# Patient Record
Sex: MALE | Race: WHITE | Employment: FULL TIME | URBAN - METROPOLITAN AREA
[De-identification: names, ages, dates, MRNs, and addresses within clinical notes are randomized per-mention and may not be internally consistent; named-entity substitution may affect disease eponyms.]

---

## 2018-07-31 ENCOUNTER — OFFICE VISIT (OUTPATIENT)
Dept: VASCULAR SURGERY | Age: 33
End: 2018-07-31

## 2018-07-31 VITALS
SYSTOLIC BLOOD PRESSURE: 118 MMHG | HEIGHT: 72 IN | WEIGHT: 172 LBS | DIASTOLIC BLOOD PRESSURE: 68 MMHG | BODY MASS INDEX: 23.3 KG/M2

## 2018-07-31 DIAGNOSIS — M54.2 CERVICAL PAIN (NECK): Primary | ICD-10-CM

## 2018-07-31 PROCEDURE — 99203 OFFICE O/P NEW LOW 30 MIN: CPT | Performed by: SURGERY

## 2018-07-31 RX ORDER — GABAPENTIN 300 MG/1
600 CAPSULE ORAL 3 TIMES DAILY
COMMUNITY

## 2018-07-31 NOTE — PROGRESS NOTES
daily as needed for Anxiety for 30 days. 1/6/12 2/5/12  Aniyah Sanders MD   alprazolam Katie Bc) 1 MG tablet Take 1 tablet by mouth 3 times daily as needed for 30 days. 12/9/11 1/8/12  Aniyah Sanders MD        Hospital Meds:    Current Outpatient Prescriptions   Medication Sig Dispense Refill    gabapentin (NEURONTIN) 600 MG tablet Take 600 mg by mouth 4 times daily. Diania Mutters metFORMIN (GLUCOPHAGE) 500 MG tablet Take 500 mg by mouth daily      finasteride (PROPECIA) 1 MG tablet Take 5 mg by mouth daily       oxyCODONE (OXY-IR) 15 MG immediate release tablet Take 15 mg by mouth every 8 hours as needed for Pain .  ibuprofen (ADVIL;MOTRIN) 800 MG tablet Take 1 tablet by mouth every 8 hours as needed for Pain or Fever 20 tablet 0    alprazolam (XANAX) 1 MG tablet Take 1 tablet by mouth 3 times daily as needed for Anxiety for 30 days. 90 tablet 2    alprazolam (XANAX) 1 MG tablet Take 1 tablet by mouth 3 times daily as needed for 30 days. 90 tablet 0     No current facility-administered medications for this visit. Social History:       Social History     Social History    Marital status: Single     Spouse name: N/A    Number of children: N/A    Years of education: N/A     Social History Main Topics    Smoking status: Never Smoker    Smokeless tobacco: None      Comment: Cigar per year    Alcohol use No    Drug use: No    Sexual activity: Not Asked     Other Topics Concern    None     Social History Narrative    None       Family Histroy:      Family History   Problem Relation Age of Onset    Heart Attack Paternal Grandfather        Review of Systems:  A 14 point review of systems was completed. Pertinent positives identified in the HPI, all other review of symptoms negative.         Physical Exam   Vital Signs: /68   Ht 6' (1.829 m)   Wt 172 lb (78 kg)   BMI 23.33 kg/m²        Admission Weight: 172 lb (78 kg)     General appearance: alert, appears stated age, cooperative and no AM

## 2018-08-23 ENCOUNTER — HOSPITAL ENCOUNTER (OUTPATIENT)
Dept: MRI IMAGING | Age: 33
Discharge: HOME | End: 2018-08-23
Attending: FAMILY MEDICINE | Admitting: FAMILY MEDICINE

## 2018-08-23 DIAGNOSIS — M25.511 RIGHT SHOULDER PAIN, UNSPECIFIED CHRONICITY: ICD-10-CM

## 2018-08-23 DIAGNOSIS — M79.5 FOREIGN BODY (FB) IN SOFT TISSUE: ICD-10-CM

## 2018-08-23 DIAGNOSIS — M25.571 RIGHT ANKLE PAIN, UNSPECIFIED CHRONICITY: ICD-10-CM

## 2021-08-16 ENCOUNTER — APPOINTMENT (OUTPATIENT)
Dept: ULTRASOUND IMAGING | Age: 36
DRG: 718 | End: 2021-08-16
Payer: OTHER GOVERNMENT

## 2021-08-16 ENCOUNTER — HOSPITAL ENCOUNTER (INPATIENT)
Age: 36
LOS: 1 days | Discharge: HOME | DRG: 718 | End: 2021-08-17
Attending: EMERGENCY MEDICINE | Admitting: INTERNAL MEDICINE
Payer: OTHER GOVERNMENT

## 2021-08-16 ENCOUNTER — ANESTHESIA (OUTPATIENT)
Dept: OPERATING ROOM | Age: 36
DRG: 718 | End: 2021-08-16
Payer: OTHER GOVERNMENT

## 2021-08-16 ENCOUNTER — ANESTHESIA EVENT (OUTPATIENT)
Dept: OPERATING ROOM | Age: 36
DRG: 718 | End: 2021-08-16
Payer: OTHER GOVERNMENT

## 2021-08-16 VITALS
RESPIRATION RATE: 19 BRPM | OXYGEN SATURATION: 98 % | DIASTOLIC BLOOD PRESSURE: 53 MMHG | TEMPERATURE: 96.8 F | SYSTOLIC BLOOD PRESSURE: 94 MMHG

## 2021-08-16 DIAGNOSIS — S31.30XA: Primary | ICD-10-CM

## 2021-08-16 PROBLEM — N50.1: Status: ACTIVE | Noted: 2021-08-16

## 2021-08-16 LAB
ANION GAP SERPL CALCULATED.3IONS-SCNC: 3 MMOL/L (ref 3–16)
APTT: 42.2 SEC (ref 26.2–38.6)
BASOPHILS ABSOLUTE: 0 K/UL (ref 0–0.2)
BASOPHILS RELATIVE PERCENT: 0.3 %
BUN BLDV-MCNC: 17 MG/DL (ref 7–20)
CALCIUM SERPL-MCNC: 8.9 MG/DL (ref 8.3–10.6)
CHLORIDE BLD-SCNC: 101 MMOL/L (ref 99–110)
CO2: 34 MMOL/L (ref 21–32)
CREAT SERPL-MCNC: 1 MG/DL (ref 0.9–1.3)
EOSINOPHILS ABSOLUTE: 0 K/UL (ref 0–0.6)
EOSINOPHILS RELATIVE PERCENT: 0.4 %
GFR AFRICAN AMERICAN: >60
GFR NON-AFRICAN AMERICAN: >60
GLUCOSE BLD-MCNC: 86 MG/DL (ref 70–99)
HCT VFR BLD CALC: 40.8 % (ref 40.5–52.5)
HEMOGLOBIN: 14.3 G/DL (ref 13.5–17.5)
INR BLD: 1.04 (ref 0.88–1.12)
LYMPHOCYTES ABSOLUTE: 1.5 K/UL (ref 1–5.1)
LYMPHOCYTES RELATIVE PERCENT: 15.3 %
MCH RBC QN AUTO: 31 PG (ref 26–34)
MCHC RBC AUTO-ENTMCNC: 34.9 G/DL (ref 31–36)
MCV RBC AUTO: 88.9 FL (ref 80–100)
MONOCYTES ABSOLUTE: 0.6 K/UL (ref 0–1.3)
MONOCYTES RELATIVE PERCENT: 6.2 %
NEUTROPHILS ABSOLUTE: 7.8 K/UL (ref 1.7–7.7)
NEUTROPHILS RELATIVE PERCENT: 77.8 %
PDW BLD-RTO: 13.7 % (ref 12.4–15.4)
PLATELET # BLD: 214 K/UL (ref 135–450)
PMV BLD AUTO: 7.5 FL (ref 5–10.5)
POTASSIUM REFLEX MAGNESIUM: 4.3 MMOL/L (ref 3.5–5.1)
PROTHROMBIN TIME: 11.8 SEC (ref 9.9–12.7)
RBC # BLD: 4.6 M/UL (ref 4.2–5.9)
SODIUM BLD-SCNC: 138 MMOL/L (ref 136–145)
WBC # BLD: 10 K/UL (ref 4–11)

## 2021-08-16 PROCEDURE — 6360000002 HC RX W HCPCS: Performed by: INTERNAL MEDICINE

## 2021-08-16 PROCEDURE — 6370000000 HC RX 637 (ALT 250 FOR IP): Performed by: INTERNAL MEDICINE

## 2021-08-16 PROCEDURE — 2500000003 HC RX 250 WO HCPCS: Performed by: NURSE ANESTHETIST, CERTIFIED REGISTERED

## 2021-08-16 PROCEDURE — 96375 TX/PRO/DX INJ NEW DRUG ADDON: CPT

## 2021-08-16 PROCEDURE — 6360000002 HC RX W HCPCS: Performed by: ANESTHESIOLOGY

## 2021-08-16 PROCEDURE — 6360000002 HC RX W HCPCS: Performed by: EMERGENCY MEDICINE

## 2021-08-16 PROCEDURE — 7100000001 HC PACU RECOVERY - ADDTL 15 MIN: Performed by: UROLOGY

## 2021-08-16 PROCEDURE — 7100000000 HC PACU RECOVERY - FIRST 15 MIN: Performed by: UROLOGY

## 2021-08-16 PROCEDURE — 2580000003 HC RX 258: Performed by: NURSE ANESTHETIST, CERTIFIED REGISTERED

## 2021-08-16 PROCEDURE — 85610 PROTHROMBIN TIME: CPT

## 2021-08-16 PROCEDURE — 6360000002 HC RX W HCPCS: Performed by: NURSE ANESTHETIST, CERTIFIED REGISTERED

## 2021-08-16 PROCEDURE — C9290 INJ, BUPIVACAINE LIPOSOME: HCPCS | Performed by: UROLOGY

## 2021-08-16 PROCEDURE — 1200000000 HC SEMI PRIVATE

## 2021-08-16 PROCEDURE — 85025 COMPLETE CBC W/AUTO DIFF WBC: CPT

## 2021-08-16 PROCEDURE — 36415 COLL VENOUS BLD VENIPUNCTURE: CPT

## 2021-08-16 PROCEDURE — 3700000001 HC ADD 15 MINUTES (ANESTHESIA): Performed by: UROLOGY

## 2021-08-16 PROCEDURE — 96376 TX/PRO/DX INJ SAME DRUG ADON: CPT

## 2021-08-16 PROCEDURE — 93975 VASCULAR STUDY: CPT

## 2021-08-16 PROCEDURE — 80048 BASIC METABOLIC PNL TOTAL CA: CPT

## 2021-08-16 PROCEDURE — 2580000003 HC RX 258: Performed by: INTERNAL MEDICINE

## 2021-08-16 PROCEDURE — 3600000004 HC SURGERY LEVEL 4 BASE: Performed by: UROLOGY

## 2021-08-16 PROCEDURE — 99283 EMERGENCY DEPT VISIT LOW MDM: CPT

## 2021-08-16 PROCEDURE — 0V950ZZ DRAINAGE OF SCROTUM, OPEN APPROACH: ICD-10-PCS | Performed by: UROLOGY

## 2021-08-16 PROCEDURE — 6360000002 HC RX W HCPCS

## 2021-08-16 PROCEDURE — 2709999900 HC NON-CHARGEABLE SUPPLY: Performed by: UROLOGY

## 2021-08-16 PROCEDURE — 3700000000 HC ANESTHESIA ATTENDED CARE: Performed by: UROLOGY

## 2021-08-16 PROCEDURE — 2580000003 HC RX 258: Performed by: UROLOGY

## 2021-08-16 PROCEDURE — 6360000002 HC RX W HCPCS: Performed by: UROLOGY

## 2021-08-16 PROCEDURE — 85730 THROMBOPLASTIN TIME PARTIAL: CPT

## 2021-08-16 PROCEDURE — 96374 THER/PROPH/DIAG INJ IV PUSH: CPT

## 2021-08-16 PROCEDURE — 3600000014 HC SURGERY LEVEL 4 ADDTL 15MIN: Performed by: UROLOGY

## 2021-08-16 PROCEDURE — 6370000000 HC RX 637 (ALT 250 FOR IP): Performed by: UROLOGY

## 2021-08-16 PROCEDURE — 3E0T3BZ INTRODUCTION OF ANESTHETIC AGENT INTO PERIPHERAL NERVES AND PLEXI, PERCUTANEOUS APPROACH: ICD-10-PCS | Performed by: UROLOGY

## 2021-08-16 PROCEDURE — 76870 US EXAM SCROTUM: CPT

## 2021-08-16 RX ORDER — ONDANSETRON 2 MG/ML
4 INJECTION INTRAMUSCULAR; INTRAVENOUS ONCE
Status: COMPLETED | OUTPATIENT
Start: 2021-08-16 | End: 2021-08-16

## 2021-08-16 RX ORDER — MAGNESIUM HYDROXIDE 1200 MG/15ML
LIQUID ORAL CONTINUOUS PRN
Status: COMPLETED | OUTPATIENT
Start: 2021-08-16 | End: 2021-08-16

## 2021-08-16 RX ORDER — LIDOCAINE HCL/PF 100 MG/5ML
SYRINGE (ML) INJECTION PRN
Status: DISCONTINUED | OUTPATIENT
Start: 2021-08-16 | End: 2021-08-16 | Stop reason: SDUPTHER

## 2021-08-16 RX ORDER — HYDROMORPHONE HCL 110MG/55ML
PATIENT CONTROLLED ANALGESIA SYRINGE INTRAVENOUS PRN
Status: DISCONTINUED | OUTPATIENT
Start: 2021-08-16 | End: 2021-08-16 | Stop reason: SDUPTHER

## 2021-08-16 RX ORDER — NALOXONE HYDROCHLORIDE 0.4 MG/ML
0.4 INJECTION, SOLUTION INTRAMUSCULAR; INTRAVENOUS; SUBCUTANEOUS PRN
Status: DISCONTINUED | OUTPATIENT
Start: 2021-08-16 | End: 2021-08-17 | Stop reason: HOSPADM

## 2021-08-16 RX ORDER — SODIUM CHLORIDE, SODIUM LACTATE, POTASSIUM CHLORIDE, CALCIUM CHLORIDE 600; 310; 30; 20 MG/100ML; MG/100ML; MG/100ML; MG/100ML
INJECTION, SOLUTION INTRAVENOUS CONTINUOUS
Status: DISCONTINUED | OUTPATIENT
Start: 2021-08-16 | End: 2021-08-16

## 2021-08-16 RX ORDER — SODIUM CHLORIDE 0.9 % (FLUSH) 0.9 %
5-40 SYRINGE (ML) INJECTION PRN
Status: DISCONTINUED | OUTPATIENT
Start: 2021-08-16 | End: 2021-08-17 | Stop reason: HOSPADM

## 2021-08-16 RX ORDER — LORAZEPAM 2 MG/ML
1 INJECTION INTRAMUSCULAR ONCE
Status: COMPLETED | OUTPATIENT
Start: 2021-08-16 | End: 2021-08-16

## 2021-08-16 RX ORDER — ONDANSETRON 2 MG/ML
4 INJECTION INTRAMUSCULAR; INTRAVENOUS
Status: DISCONTINUED | OUTPATIENT
Start: 2021-08-16 | End: 2021-08-16 | Stop reason: HOSPADM

## 2021-08-16 RX ORDER — FENTANYL CITRATE 50 UG/ML
INJECTION, SOLUTION INTRAMUSCULAR; INTRAVENOUS PRN
Status: DISCONTINUED | OUTPATIENT
Start: 2021-08-16 | End: 2021-08-16 | Stop reason: SDUPTHER

## 2021-08-16 RX ORDER — GABAPENTIN 300 MG/1
600 CAPSULE ORAL 3 TIMES DAILY
Status: DISCONTINUED | OUTPATIENT
Start: 2021-08-16 | End: 2021-08-17 | Stop reason: HOSPADM

## 2021-08-16 RX ORDER — ONDANSETRON 4 MG/1
4 TABLET, ORALLY DISINTEGRATING ORAL EVERY 8 HOURS PRN
Status: DISCONTINUED | OUTPATIENT
Start: 2021-08-16 | End: 2021-08-17 | Stop reason: HOSPADM

## 2021-08-16 RX ORDER — ACETAMINOPHEN 325 MG/1
650 TABLET ORAL EVERY 6 HOURS PRN
Status: DISCONTINUED | OUTPATIENT
Start: 2021-08-16 | End: 2021-08-17 | Stop reason: HOSPADM

## 2021-08-16 RX ORDER — OXYCODONE HYDROCHLORIDE AND ACETAMINOPHEN 5; 325 MG/1; MG/1
1 TABLET ORAL EVERY 4 HOURS PRN
COMMUNITY

## 2021-08-16 RX ORDER — FENTANYL CITRATE 50 UG/ML
25 INJECTION, SOLUTION INTRAMUSCULAR; INTRAVENOUS
Status: COMPLETED | OUTPATIENT
Start: 2021-08-16 | End: 2021-08-16

## 2021-08-16 RX ORDER — METHOCARBAMOL 500 MG/1
500 TABLET, FILM COATED ORAL 2 TIMES DAILY
COMMUNITY

## 2021-08-16 RX ORDER — POLYETHYLENE GLYCOL 3350 17 G/17G
17 POWDER, FOR SOLUTION ORAL DAILY PRN
Status: DISCONTINUED | OUTPATIENT
Start: 2021-08-16 | End: 2021-08-17 | Stop reason: HOSPADM

## 2021-08-16 RX ORDER — ONDANSETRON 2 MG/ML
4 INJECTION INTRAMUSCULAR; INTRAVENOUS EVERY 6 HOURS PRN
Status: DISCONTINUED | OUTPATIENT
Start: 2021-08-16 | End: 2021-08-17 | Stop reason: HOSPADM

## 2021-08-16 RX ORDER — FENTANYL CITRATE 50 UG/ML
25 INJECTION, SOLUTION INTRAMUSCULAR; INTRAVENOUS EVERY 5 MIN PRN
Status: DISCONTINUED | OUTPATIENT
Start: 2021-08-16 | End: 2021-08-16 | Stop reason: HOSPADM

## 2021-08-16 RX ORDER — SODIUM CHLORIDE 0.9 % (FLUSH) 0.9 %
5-40 SYRINGE (ML) INJECTION EVERY 12 HOURS SCHEDULED
Status: DISCONTINUED | OUTPATIENT
Start: 2021-08-16 | End: 2021-08-17 | Stop reason: HOSPADM

## 2021-08-16 RX ORDER — TADALAFIL 20 MG/1
20 TABLET ORAL PRN
COMMUNITY

## 2021-08-16 RX ORDER — PROPOFOL 10 MG/ML
INJECTION, EMULSION INTRAVENOUS PRN
Status: DISCONTINUED | OUTPATIENT
Start: 2021-08-16 | End: 2021-08-16 | Stop reason: SDUPTHER

## 2021-08-16 RX ORDER — CEFAZOLIN SODIUM 1 G/3ML
INJECTION, POWDER, FOR SOLUTION INTRAMUSCULAR; INTRAVENOUS PRN
Status: DISCONTINUED | OUTPATIENT
Start: 2021-08-16 | End: 2021-08-16 | Stop reason: SDUPTHER

## 2021-08-16 RX ORDER — ACETAMINOPHEN 650 MG/1
650 SUPPOSITORY RECTAL EVERY 6 HOURS PRN
Status: DISCONTINUED | OUTPATIENT
Start: 2021-08-16 | End: 2021-08-17 | Stop reason: HOSPADM

## 2021-08-16 RX ORDER — SODIUM CHLORIDE 9 MG/ML
INJECTION, SOLUTION INTRAVENOUS CONTINUOUS PRN
Status: DISCONTINUED | OUTPATIENT
Start: 2021-08-16 | End: 2021-08-16 | Stop reason: SDUPTHER

## 2021-08-16 RX ORDER — OXYCODONE HYDROCHLORIDE AND ACETAMINOPHEN 5; 325 MG/1; MG/1
1 TABLET ORAL EVERY 4 HOURS PRN
Status: DISCONTINUED | OUTPATIENT
Start: 2021-08-16 | End: 2021-08-17 | Stop reason: HOSPADM

## 2021-08-16 RX ORDER — OXYCODONE AND ACETAMINOPHEN 10; 325 MG/1; MG/1
1 TABLET ORAL EVERY 4 HOURS PRN
Status: DISCONTINUED | OUTPATIENT
Start: 2021-08-16 | End: 2021-08-16

## 2021-08-16 RX ORDER — FINASTERIDE 5 MG/1
5 TABLET, FILM COATED ORAL DAILY
Status: DISCONTINUED | OUTPATIENT
Start: 2021-08-16 | End: 2021-08-17 | Stop reason: HOSPADM

## 2021-08-16 RX ORDER — OXYCODONE HYDROCHLORIDE AND ACETAMINOPHEN 5; 325 MG/1; MG/1
1 TABLET ORAL EVERY 4 HOURS PRN
Status: DISCONTINUED | OUTPATIENT
Start: 2021-08-16 | End: 2021-08-16

## 2021-08-16 RX ORDER — FENTANYL CITRATE 50 UG/ML
25 INJECTION, SOLUTION INTRAMUSCULAR; INTRAVENOUS ONCE
Status: COMPLETED | OUTPATIENT
Start: 2021-08-16 | End: 2021-08-16

## 2021-08-16 RX ORDER — TRAZODONE HYDROCHLORIDE 150 MG/1
150 TABLET ORAL NIGHTLY
COMMUNITY
End: 2021-08-16 | Stop reason: CLARIF

## 2021-08-16 RX ORDER — DUTASTERIDE 0.5 MG/1
0.5 CAPSULE, LIQUID FILLED ORAL DAILY
COMMUNITY

## 2021-08-16 RX ORDER — TRAMADOL HYDROCHLORIDE 50 MG/1
50 TABLET ORAL EVERY 4 HOURS PRN
COMMUNITY

## 2021-08-16 RX ORDER — DEXAMETHASONE SODIUM PHOSPHATE 4 MG/ML
INJECTION, SOLUTION INTRA-ARTICULAR; INTRALESIONAL; INTRAMUSCULAR; INTRAVENOUS; SOFT TISSUE PRN
Status: DISCONTINUED | OUTPATIENT
Start: 2021-08-16 | End: 2021-08-16 | Stop reason: SDUPTHER

## 2021-08-16 RX ORDER — SODIUM CHLORIDE 9 MG/ML
25 INJECTION, SOLUTION INTRAVENOUS PRN
Status: DISCONTINUED | OUTPATIENT
Start: 2021-08-16 | End: 2021-08-17 | Stop reason: HOSPADM

## 2021-08-16 RX ORDER — OXYCODONE AND ACETAMINOPHEN 10; 325 MG/1; MG/1
1 TABLET ORAL EVERY 4 HOURS PRN
Status: DISCONTINUED | OUTPATIENT
Start: 2021-08-16 | End: 2021-08-17 | Stop reason: HOSPADM

## 2021-08-16 RX ORDER — METHOCARBAMOL 500 MG/1
500 TABLET, FILM COATED ORAL 2 TIMES DAILY
Status: DISCONTINUED | OUTPATIENT
Start: 2021-08-16 | End: 2021-08-17 | Stop reason: HOSPADM

## 2021-08-16 RX ORDER — ONDANSETRON 2 MG/ML
INJECTION INTRAMUSCULAR; INTRAVENOUS PRN
Status: DISCONTINUED | OUTPATIENT
Start: 2021-08-16 | End: 2021-08-16 | Stop reason: SDUPTHER

## 2021-08-16 RX ADMIN — FENTANYL CITRATE 100 MCG: 50 INJECTION, SOLUTION INTRAMUSCULAR; INTRAVENOUS at 20:54

## 2021-08-16 RX ADMIN — LORAZEPAM 1 MG: 2 INJECTION INTRAMUSCULAR; INTRAVENOUS at 13:17

## 2021-08-16 RX ADMIN — HYDROMORPHONE HYDROCHLORIDE 0.5 MG: 1 INJECTION, SOLUTION INTRAMUSCULAR; INTRAVENOUS; SUBCUTANEOUS at 22:15

## 2021-08-16 RX ADMIN — GABAPENTIN 600 MG: 300 CAPSULE ORAL at 23:25

## 2021-08-16 RX ADMIN — HYDROMORPHONE HYDROCHLORIDE 0.5 MG: 1 INJECTION, SOLUTION INTRAMUSCULAR; INTRAVENOUS; SUBCUTANEOUS at 15:05

## 2021-08-16 RX ADMIN — FENTANYL CITRATE 25 MCG: 50 INJECTION INTRAMUSCULAR; INTRAVENOUS at 18:53

## 2021-08-16 RX ADMIN — CEFAZOLIN SODIUM 2000 MG: 1 POWDER, FOR SOLUTION INTRAMUSCULAR; INTRAVENOUS at 20:25

## 2021-08-16 RX ADMIN — HYDROMORPHONE HYDROCHLORIDE 1 MG: 1 INJECTION, SOLUTION INTRAMUSCULAR; INTRAVENOUS; SUBCUTANEOUS at 09:06

## 2021-08-16 RX ADMIN — METHOCARBAMOL 500 MG: 500 TABLET ORAL at 23:25

## 2021-08-16 RX ADMIN — Medication 1 MG: at 12:54

## 2021-08-16 RX ADMIN — HYDROMORPHONE HYDROCHLORIDE 1 MG: 1 INJECTION, SOLUTION INTRAMUSCULAR; INTRAVENOUS; SUBCUTANEOUS at 11:37

## 2021-08-16 RX ADMIN — DEXAMETHASONE SODIUM PHOSPHATE 4 MG: 4 INJECTION, SOLUTION INTRAMUSCULAR; INTRAVENOUS at 20:22

## 2021-08-16 RX ADMIN — HYDROMORPHONE HYDROCHLORIDE 1 MG: 1 INJECTION, SOLUTION INTRAMUSCULAR; INTRAVENOUS; SUBCUTANEOUS at 13:08

## 2021-08-16 RX ADMIN — HYDROMORPHONE HYDROCHLORIDE 1 MG: 1 INJECTION, SOLUTION INTRAMUSCULAR; INTRAVENOUS; SUBCUTANEOUS at 11:04

## 2021-08-16 RX ADMIN — PROPOFOL 400 MG: 10 INJECTION, EMULSION INTRAVENOUS at 20:20

## 2021-08-16 RX ADMIN — ONDANSETRON 4 MG: 2 INJECTION INTRAMUSCULAR; INTRAVENOUS at 20:22

## 2021-08-16 RX ADMIN — ONDANSETRON 4 MG: 2 INJECTION INTRAMUSCULAR; INTRAVENOUS at 09:44

## 2021-08-16 RX ADMIN — HYDROMORPHONE HYDROCHLORIDE 1 MG: 1 INJECTION, SOLUTION INTRAMUSCULAR; INTRAVENOUS; SUBCUTANEOUS at 12:54

## 2021-08-16 RX ADMIN — FENTANYL CITRATE 25 MCG: 50 INJECTION INTRAMUSCULAR; INTRAVENOUS at 18:13

## 2021-08-16 RX ADMIN — HYDROMORPHONE HYDROCHLORIDE 1 MG: 1 INJECTION, SOLUTION INTRAMUSCULAR; INTRAVENOUS; SUBCUTANEOUS at 09:43

## 2021-08-16 RX ADMIN — SODIUM CHLORIDE, POTASSIUM CHLORIDE, SODIUM LACTATE AND CALCIUM CHLORIDE: 600; 310; 30; 20 INJECTION, SOLUTION INTRAVENOUS at 15:34

## 2021-08-16 RX ADMIN — HYDROMORPHONE HYDROCHLORIDE 0.5 MG: 1 INJECTION, SOLUTION INTRAMUSCULAR; INTRAVENOUS; SUBCUTANEOUS at 16:50

## 2021-08-16 RX ADMIN — Medication 100 MG: at 20:20

## 2021-08-16 RX ADMIN — HYDROMORPHONE HYDROCHLORIDE 1 MG: 2 INJECTION, SOLUTION INTRAMUSCULAR; INTRAVENOUS; SUBCUTANEOUS at 20:42

## 2021-08-16 RX ADMIN — GABAPENTIN 600 MG: 300 CAPSULE ORAL at 15:05

## 2021-08-16 RX ADMIN — DEXMEDETOMIDINE HYDROCHLORIDE 50 MCG: 100 INJECTION, SOLUTION INTRAVENOUS at 20:13

## 2021-08-16 RX ADMIN — HYDROMORPHONE HYDROCHLORIDE 1 MG: 1 INJECTION, SOLUTION INTRAMUSCULAR; INTRAVENOUS; SUBCUTANEOUS at 12:41

## 2021-08-16 RX ADMIN — HYDROMORPHONE HYDROCHLORIDE 1 MG: 1 INJECTION, SOLUTION INTRAMUSCULAR; INTRAVENOUS; SUBCUTANEOUS at 19:11

## 2021-08-16 RX ADMIN — HYDROMORPHONE HYDROCHLORIDE 1 MG: 2 INJECTION, SOLUTION INTRAMUSCULAR; INTRAVENOUS; SUBCUTANEOUS at 20:17

## 2021-08-16 RX ADMIN — METHOCARBAMOL 500 MG: 500 TABLET ORAL at 15:05

## 2021-08-16 RX ADMIN — SODIUM CHLORIDE: 9 INJECTION, SOLUTION INTRAVENOUS at 20:16

## 2021-08-16 RX ADMIN — LORAZEPAM 1 MG: 2 INJECTION INTRAMUSCULAR; INTRAVENOUS at 17:40

## 2021-08-16 RX ADMIN — FINASTERIDE 5 MG: 5 TABLET, FILM COATED ORAL at 15:05

## 2021-08-16 ASSESSMENT — PULMONARY FUNCTION TESTS
PIF_VALUE: 16
PIF_VALUE: 8
PIF_VALUE: 16
PIF_VALUE: 5
PIF_VALUE: 16
PIF_VALUE: 10
PIF_VALUE: 16
PIF_VALUE: 5
PIF_VALUE: 16
PIF_VALUE: 5
PIF_VALUE: 16
PIF_VALUE: 0
PIF_VALUE: 5
PIF_VALUE: 16
PIF_VALUE: 16
PIF_VALUE: 6
PIF_VALUE: 16
PIF_VALUE: 16
PIF_VALUE: 6
PIF_VALUE: 16
PIF_VALUE: 0
PIF_VALUE: 16
PIF_VALUE: 5
PIF_VALUE: 5
PIF_VALUE: 16
PIF_VALUE: 1
PIF_VALUE: 16
PIF_VALUE: 16
PIF_VALUE: 5
PIF_VALUE: 16
PIF_VALUE: 16
PIF_VALUE: 4
PIF_VALUE: 16
PIF_VALUE: 0
PIF_VALUE: 20
PIF_VALUE: 16
PIF_VALUE: 6
PIF_VALUE: 16
PIF_VALUE: 16
PIF_VALUE: 5
PIF_VALUE: 16
PIF_VALUE: 16
PIF_VALUE: 6
PIF_VALUE: 0
PIF_VALUE: 16
PIF_VALUE: 6
PIF_VALUE: 16
PIF_VALUE: 5
PIF_VALUE: 16
PIF_VALUE: 6
PIF_VALUE: 16
PIF_VALUE: 6
PIF_VALUE: 16

## 2021-08-16 ASSESSMENT — PAIN DESCRIPTION - DESCRIPTORS
DESCRIPTORS: THROBBING
DESCRIPTORS: THROBBING
DESCRIPTORS: ACHING
DESCRIPTORS: SHARP;SHOOTING;THROBBING;CONSTANT

## 2021-08-16 ASSESSMENT — PAIN SCALES - GENERAL
PAINLEVEL_OUTOF10: 10
PAINLEVEL_OUTOF10: 9
PAINLEVEL_OUTOF10: 9
PAINLEVEL_OUTOF10: 10
PAINLEVEL_OUTOF10: 0
PAINLEVEL_OUTOF10: 6
PAINLEVEL_OUTOF10: 10
PAINLEVEL_OUTOF10: 9
PAINLEVEL_OUTOF10: 9
PAINLEVEL_OUTOF10: 0
PAINLEVEL_OUTOF10: 8
PAINLEVEL_OUTOF10: 10
PAINLEVEL_OUTOF10: 5
PAINLEVEL_OUTOF10: 0
PAINLEVEL_OUTOF10: 7
PAINLEVEL_OUTOF10: 8
PAINLEVEL_OUTOF10: 8

## 2021-08-16 ASSESSMENT — PAIN DESCRIPTION - PAIN TYPE
TYPE: SURGICAL PAIN
TYPE: ACUTE PAIN
TYPE: SURGICAL PAIN
TYPE: ACUTE PAIN
TYPE: ACUTE PAIN

## 2021-08-16 ASSESSMENT — PAIN DESCRIPTION - LOCATION
LOCATION: SCROTUM
LOCATION: SCROTUM;PENIS
LOCATION: PERINEUM;ABDOMEN
LOCATION: SCROTUM
LOCATION: SCROTUM

## 2021-08-16 ASSESSMENT — PAIN DESCRIPTION - ORIENTATION
ORIENTATION: LEFT

## 2021-08-16 ASSESSMENT — PAIN DESCRIPTION - PROGRESSION: CLINICAL_PROGRESSION: GRADUALLY WORSENING

## 2021-08-16 ASSESSMENT — PAIN - FUNCTIONAL ASSESSMENT: PAIN_FUNCTIONAL_ASSESSMENT: PREVENTS OR INTERFERES WITH MANY ACTIVE NOT PASSIVE ACTIVITIES

## 2021-08-16 ASSESSMENT — PAIN DESCRIPTION - FREQUENCY
FREQUENCY: CONTINUOUS

## 2021-08-16 ASSESSMENT — PAIN DESCRIPTION - ONSET
ONSET: ON-GOING
ONSET: ON-GOING

## 2021-08-16 NOTE — ED TRIAGE NOTES
Patient reports to ED with left testicular pain 9/10. Was hit with softball yesterday afternoon, tried to \"sleep it off\" but woke up this morning with extreme pain and swelling of left testicle. A&Ox4.

## 2021-08-16 NOTE — H&P
Hospital Medicine History & Physical      PCP: Referring Not In System (Inactive)    Date of Admission: 8/16/2021      Chief Complaint:      Chief Complaint   Patient presents with    Testicle Pain           History Of Present Illness:    43 y.o. male who presented to hospital with cc of left sided testicular pain. Patient was playing soft ball , and had soft ball injury. He has increasing pain and swelling associated bruise which prompted ER visit. In ER US showed ?hematoma with questionable rupture of tunica. Urology was consulted, they are planning scrotal exploration however patient had lunch in ER which postponed surgery. Patient currently c/o severe pain in left testicle. Past Medical History:          Diagnosis Date    Back pain     Complex regional pain syndrome     Insomnia     Kidney disease     Neck pain     Post traumatic stress disorder        Past Surgical History:          Procedure Laterality Date    ARM SURGERY      upper arm/elbow surgery    HERNIA REPAIR         Medications Prior to Admission:      Prior to Admission medications    Medication Sig Start Date End Date Taking? Authorizing Provider   oxyCODONE-acetaminophen (PERCOCET) 5-325 MG per tablet Take 1 tablet by mouth every 4 hours as needed for Pain. Yes Historical Provider, MD   dutasteride (AVODART) 0.5 MG capsule Take 0.5 mg by mouth daily    Yes Historical Provider, MD   traMADol (ULTRAM) 50 MG tablet Take 50 mg by mouth every 4 hours as needed for Pain. Yes Historical Provider, MD   tadalafil (CIALIS) 20 MG tablet Take 20 mg by mouth as needed for Erectile Dysfunction    Yes Historical Provider, MD   methocarbamol (ROBAXIN) 500 MG tablet Take 500 mg by mouth 2 times daily    Yes Historical Provider, MD   gabapentin (NEURONTIN) 300 MG capsule Take 600 mg by mouth 3 times daily.     Yes Historical Provider, MD   metFORMIN (GLUCOPHAGE) 500 MG tablet Take 500 mg by mouth 2 times daily (with meals)    Yes Historical Provider, MD       Allergies:  Patient has no known allergies. Social History:      The patient currentlylives at home    TOBACCO:   reports that he has never smoked. He has never used smokeless tobacco.  ETOH:   reports no history of alcohol use. Family History:      . Positive as follows:        Problem Relation Age of Onset    Heart Attack Paternal Grandfather        REVIEW OF SYSTEMS:   Pertinent positives as noted in the HPI. All other systems reviewed and negative. PHYSICAL EXAM PERFORMED:    /88   Pulse 93   Temp 98.6 °F (37 °C) (Oral)   Resp 26   Ht 6' (1.829 m)   Wt 190 lb (86.2 kg)   SpO2 100%   BMI 25.77 kg/m²     Physical Exam  Constitutional:       Appearance: Normal appearance. HENT:      Head: Normocephalic and atraumatic. Cardiovascular:      Rate and Rhythm: Normal rate and regular rhythm. Pulses: Normal pulses. Heart sounds: Normal heart sounds. Pulmonary:      Effort: Pulmonary effort is normal.      Breath sounds: Normal breath sounds. Abdominal:      General: Abdomen is flat. Palpations: Abdomen is soft. Genitourinary:     Comments: Left testes swollen, bruise. Musculoskeletal:         General: Normal range of motion. Skin:     General: Skin is warm and dry. Capillary Refill: Capillary refill takes less than 2 seconds. Neurological:      General: No focal deficit present. Mental Status: He is alert and oriented to person, place, and time. Psychiatric:         Mood and Affect: Mood normal.         Behavior: Behavior normal.         Labs:     Recent Labs     08/16/21  1140   WBC 10.0   HGB 14.3   HCT 40.8        Recent Labs     08/16/21  1140      K 4.3      CO2 34*   BUN 17   CREATININE 1.0   CALCIUM 8.9     No results for input(s): AST, ALT, BILIDIR, BILITOT, ALKPHOS in the last 72 hours.   Recent Labs     08/16/21  1140   INR 1.04     No results for input(s): Mauricio Oiler in the last 72 hours.    Urinalysis:   Lab Results   Component Value Date    NITRU Negative 04/02/2017    WBCUA 1 04/02/2017    RBCUA 1 04/02/2017    BLOODU Negative 04/02/2017    SPECGRAV 1.030 04/02/2017    GLUCOSEU Negative 04/02/2017       Radiology:         US SCROTUM AND TESTICLES   Final Result      1. Focal area of heterogeneity in the left testicle likely representing testicular hematoma given the history of trauma. 2.  Focal contour bulge at the location of heterogeneity can be an indirect sign of tunica rupture. 3.  Heterogeneous enlarged epididymis suggesting additional epididymal injury. 4.  Hemorrhagic left scrotal fluid and scrotal hematoma. 5.  No evidence of torsion. US DUP ABD PEL RETRO SCROT COMPLETE   Final Result      1. Focal area of heterogeneity in the left testicle likely representing testicular hematoma given the history of trauma. 2.  Focal contour bulge at the location of heterogeneity can be an indirect sign of tunica rupture. 3.  Heterogeneous enlarged epididymis suggesting additional epididymal injury. 4.  Hemorrhagic left scrotal fluid and scrotal hematoma. 5.  No evidence of torsion. ASSESSMENT & PLAN  Active Problems:    Hematoma of testis  Resolved Problems:    * No resolved hospital problems. *    Plan  Hematoma  Admit to Tele  NPO  Dilaudid pain panel, with narcan PRN  Urology consulted, discussed with them ,surgery tonight. Pre diabetes  Hold home metformin. DVT ppx: hold lovenox for procedure as well as testicular hematoma. Code Status: Full        Cayetano Ruelas MD    Thank you Referring Not In System (Inactive) for the opportunity to be involved in this patient's care. If youhave any questions or concerns please feel free to contact me at [phone].

## 2021-08-16 NOTE — CONSULTS
Clinical Pharmacy Progress Note  Pharmacy to assist with Home Medication information    Admit date: 8/16/2021     Subjective/Objective:  Patient is a 37yr old male admitted with testicular hematoma. Pharmacy has been asked by Dr. Yasir Costa to assist with obtaining home medication information. Assessment/Plan:    1. Home Medication information:    · Spoke with patient and family member in the room. Also attempted to confirm with [facility] report and some [facility]. · Patient was certainly in discomfort yet still able to answer most questions. · Home Medication List in Epic is complete. Changes made to medication list:   Medications removed (no longer taking):  · Finasteride  · Prazosin  · Alprazolam  · Oxycodone  · Ibuprofen  · Trazodone    Medications added:   · Tramadol    Medication doses / instructions adjusted:   · Gabapentin dose  · Metformin dose    Dr. Yasir Costa aware of updated list.       Thank you, please call with questions.    Lugenia Boxer PharmD., [facility]   8/16/2021 1:49 PM  Wireless: [phone]

## 2021-08-16 NOTE — PROGRESS NOTES
Patient alert and oriented x4. VSS with the exception of slightly elevated bp d/t pain. Patient complaining of 10/10 pain in scrotal area, prn pain medication given with not much benefit. Patient with swollen scrotal area, ice applied. Patient with no urination since arrival to unit, will bladder scan as needed. IVF infusing. Patient denies any other needs at this time. Bed is in the lowest position, call light and bedside table within reach. Patient bed alarm is on. Will continue to monitor for changes in patient status.      Electronically signed by Brittany Ramos RN on 8/16/2021 at 4:57 PM

## 2021-08-16 NOTE — ED PROVIDER NOTES
[id number]          ATTENDING PHYSICIAN NOTE       Date of evaluation: 8/16/2021    Chief Complaint     Testicle Pain      History of Present Illness     Anoop Weston is a 43 y.o. male who presents after being hit in the left testicle yesterday afternoon about 3:00 with a softball. Says that he was down for couple minutes, but was able to get back up with some soreness and continued to play through another 3 games or so. Says that throughout the course the afternoon into the evening he began to feel more pain, and this morning felt it was too much. There was not a sudden acute change in pain, just a gradual building up to the point that become unbearable. He localizes pain to the left testicle in the inferior aspect mostly, where he has had significant swelling, also says he had some radiation up into the left inguinal area    Review of Systems     Review of Systems  Pertinent positives and negatives are listed in the HPI; otherwise all systems are reviewed and were negative. Past Medical, Surgical, Family, and Social History     He has a past medical history of Back pain, Complex regional pain syndrome, Insomnia, Kidney disease, Neck pain, and Post traumatic stress disorder. He has a past surgical history that includes hernia repair and Arm Surgery. His family history includes Heart Attack in his paternal grandfather. He reports that he has never smoked. He has never used smokeless tobacco. He reports that he does not drink alcohol and does not use drugs. Medications     Current Discharge Medication List      CONTINUE these medications which have NOT CHANGED    Details   oxyCODONE-acetaminophen (PERCOCET) 5-325 MG per tablet Take 1 tablet by mouth every 4 hours as needed for Pain.       dutasteride (AVODART) 0.5 MG capsule Take 0.5 mg by mouth daily       traMADol (ULTRAM) 50 MG tablet Take 50 mg by mouth every 4 hours as needed for Pain.       tadalafil (CIALIS) 20 MG tablet Take 20 mg by mouth as needed for Erectile Dysfunction       methocarbamol (ROBAXIN) 500 MG tablet Take 500 mg by mouth 2 times daily       gabapentin (NEURONTIN) 300 MG capsule Take 600 mg by mouth 3 times daily. metFORMIN (GLUCOPHAGE) 500 MG tablet Take 500 mg by mouth 2 times daily (with meals)              Allergies     He has No Known Allergies. Physical Exam     INITIAL VITALS: BP: 125/72, Temp: 98.6 °F (37 °C), Pulse: 79, Resp: 22, SpO2: 99 %   Physical Exam  Constitutional:       General: He is in acute distress. HENT:      Head: Normocephalic and atraumatic. Cardiovascular:      Rate and Rhythm: Normal rate. Pulmonary:      Effort: Pulmonary effort is normal. No respiratory distress. Abdominal:      General: Abdomen is flat. Palpations: Abdomen is soft. Tenderness: There is no abdominal tenderness. Genitourinary:     Penis: Normal.       Comments: Right testicle nontender; left testicle with some significant swelling, ecchymosis noted on the inferior portion of the scrotum, diffusely tender to even light palpation. No inguinal masses appreciated  Musculoskeletal:      Cervical back: Normal range of motion. Neurological:      General: No focal deficit present. Mental Status: He is alert and oriented to person, place, and time. Diagnostic Results     EKG       RADIOLOGY:  US SCROTUM AND TESTICLES   Final Result      1. Focal area of heterogeneity in the left testicle likely representing testicular hematoma given the history of trauma. 2.  Focal contour bulge at the location of heterogeneity can be an indirect sign of tunica rupture. 3.  Heterogeneous enlarged epididymis suggesting additional epididymal injury. 4.  Hemorrhagic left scrotal fluid and scrotal hematoma. 5.  No evidence of torsion. US DUP ABD PEL RETRO SCROT COMPLETE   Final Result      1.   Focal area of heterogeneity in the left testicle likely representing testicular hematoma given the history of trauma. 2.  Focal contour bulge at the location of heterogeneity can be an indirect sign of tunica rupture. 3.  Heterogeneous enlarged epididymis suggesting additional epididymal injury. 4.  Hemorrhagic left scrotal fluid and scrotal hematoma. 5.  No evidence of torsion. LABS:   Results for orders placed or performed during the hospital encounter of 08/16/21   CBC Auto Differential   Result Value Ref Range    WBC 10.0 4.0 - 11.0 K/uL    RBC 4.60 4.20 - 5.90 M/uL    Hemoglobin 14.3 13.5 - 17.5 g/dL    Hematocrit 40.8 40.5 - 52.5 %    MCV 88.9 80.0 - 100.0 fL    MCH 31.0 26.0 - 34.0 pg    MCHC 34.9 31.0 - 36.0 g/dL    RDW 13.7 12.4 - 15.4 %    Platelets 436 061 - 073 K/uL    MPV 7.5 5.0 - 10.5 fL    Neutrophils % 77.8 %    Lymphocytes % 15.3 %    Monocytes % 6.2 %    Eosinophils % 0.4 %    Basophils % 0.3 %    Neutrophils Absolute 7.8 (H) 1.7 - 7.7 K/uL    Lymphocytes Absolute 1.5 1.0 - 5.1 K/uL    Monocytes Absolute 0.6 0.0 - 1.3 K/uL    Eosinophils Absolute 0.0 0.0 - 0.6 K/uL    Basophils Absolute 0.0 0.0 - 0.2 K/uL   Basic Metabolic Panel w/ Reflex to MG   Result Value Ref Range    Sodium 138 136 - 145 mmol/L    Potassium reflex Magnesium 4.3 3.5 - 5.1 mmol/L    Chloride 101 99 - 110 mmol/L    CO2 34 (H) 21 - 32 mmol/L    Anion Gap 3 3 - 16    Glucose 86 70 - 99 mg/dL    BUN 17 7 - 20 mg/dL    CREATININE 1.0 0.9 - 1.3 mg/dL    GFR Non-African American >60 >60    GFR African American >60 >60    Calcium 8.9 8.3 - 10.6 mg/dL   APTT   Result Value Ref Range    aPTT 42.2 (H) 26.2 - 38.6 sec   Protime-INR   Result Value Ref Range    Protime 11.8 9.9 - 12.7 sec    INR 1.04 0.88 - 1.12       ED BEDSIDE ULTRASOUND:      RECENT VITALS:  BP: (!) 142/78,Temp: 97.6 °F (36.4 °C), Pulse: 63, Resp: 20, SpO2: 100 %     Procedures         ED Course     Nursing Notes, Past Medical Hx, Past Surgical Hx, Social Hx,Allergies, and Family Hx were reviewed.     patient was given pain control. He otherwise appears uninjured, his labs are otherwise unremarkable. He will be admitted the hospitalist and plan for urology to go to the OR. Clinical Impression     1. Testicular fracture, initial encounter        Disposition     PATIENT REFERRED TO:  No follow-up provider specified.     DISCHARGE MEDICATIONS:  Current Discharge Medication List          DISPOSITION Admitted 08/16/2021 01:47:21 PM         Fariha Goldberg MD  08/16/21 [id number]

## 2021-08-16 NOTE — CONSULTS
Urology Attending Consult Note      Reason for Consultation: Testicle pain    History: 44 yo M who presented to [facility] ER this AM with complaints of left testicle pain extending up into his left lower abdomen. His left testicle was hit with a softball yesterday afternoon around 3:00PM. Pain continued to worsen throughout the evening and became so severe this morning so he sought treatment. Scrotal ultrasound reveals likely left testicular hematoma and possible tunica rupture. No evidence of torsion noted on scrotal ultrasound. Of note, he ate a turkey sandwich around 1000 this AM.    Family History, Social History, Review of Systems:  Reviewed and agreed to as per chart    Vitals:  /88   Pulse 93   Temp 98.6 °F (37 °C) (Oral)   Resp 26   Ht 6' (1.829 m)   Wt 190 lb (86.2 kg)   SpO2 100%   BMI 25.77 kg/m²   Temp  Av.6 °F (37 °C)  Min: 98.6 °F (37 °C)  Max: 98.6 °F (37 °C)  No intake or output data in the 24 hours ending 21 1148      Physical:   Well developed, well nourished in no acute distress   Mood indicates no abnormalities. Pt doesnt appear depressed   Orientated to time and place   Neck is supple, trachea is midline   Respiratory effort is normal   Cardiovascular show no extremity swelling   Abdomen no masses or hernias are palpated, there is no tenderness. Liver and Spleen appear normal.   Skin show no abnormal lesions   Lymph nodes are not palpated in the inguinal, neck, or axillary area. Male :   Penis appears normal and circumcised   Urethral meatus is normal in size and location   L scrotum enlarged with underlying bruising noted, representing hematoma. Patient very tender to palpation. No tenderness to palpation of R scrotum.         Labs:  WBC:    Lab Results   Component Value Date    WBC 10.8 2017     Hemoglobin/Hematocrit:    Lab Results   Component Value Date    HGB 16.8 2017    HCT 48.0 2017     BMP:    Lab Results   Component Value Date     04/02/2017    K 4.0 04/02/2017    CL 95 04/02/2017    CO2 25 04/02/2017    BUN 13 04/02/2017    LABALBU 4.1 04/02/2017    CREATININE 1.0 04/02/2017    CALCIUM 8.8 04/02/2017    GFRAA >60 04/02/2017    LABGLOM >60 04/02/2017     PT/INR:  No results found for: PROTIME, INR  PTT:  No results found for: APTT[APTT    Imaging: SCROTAL US 8/16/21  Impression       1.  Focal area of heterogeneity in the left testicle likely representing testicular hematoma given the history of trauma. 2.  Focal contour bulge at the location of heterogeneity can be an indirect sign of tunica rupture. 3.  Heterogeneous enlarged epididymis suggesting additional epididymal injury. 4.  Hemorrhagic left scrotal fluid and scrotal hematoma. 5.  No evidence of torsion. Impression/Plan: 44 yo M who presented with left testicular pain/swelling. Ultrasound reveals testicular hematoma with possible tunica rupture.  -Patient very uncomfortable and anxious on exam today, tearful.   -Exam reveals a swollen left testicle with underlying bruising noted. He is very tender to palpation.  -Unfortunately he ate around 10:00 this morning. Spoke with anesthesiologist who stated they cannot start a case until 8 hours after solid food. Therefore, we will add patient on tonight for scrotal exploration, hematoma evacuation and possible tunica repair.  -Due to issues with pain control, we have recommended admission to medicine at this time. He then can be monitored post-operatively.  -Consent obtained, keep NPO until procedure.     NEVA Pollard

## 2021-08-16 NOTE — ED NOTES
Report called to Cosme Oliver RN, 5S. Patient ready for transport to [facility].      Collette Do, RN  08/16/21 [id number]  [address] , RN  08/16/21 1413

## 2021-08-17 VITALS
BODY MASS INDEX: 25.73 KG/M2 | WEIGHT: 190 LBS | OXYGEN SATURATION: 98 % | HEIGHT: 72 IN | TEMPERATURE: 97.5 F | RESPIRATION RATE: 16 BRPM | HEART RATE: 85 BPM | SYSTOLIC BLOOD PRESSURE: 126 MMHG | DIASTOLIC BLOOD PRESSURE: 65 MMHG

## 2021-08-17 LAB
ANION GAP SERPL CALCULATED.3IONS-SCNC: 14 MMOL/L (ref 3–16)
BASOPHILS ABSOLUTE: 0 K/UL (ref 0–0.2)
BASOPHILS RELATIVE PERCENT: 0.1 %
BUN BLDV-MCNC: 18 MG/DL (ref 7–20)
CALCIUM SERPL-MCNC: 8.9 MG/DL (ref 8.3–10.6)
CHLORIDE BLD-SCNC: 103 MMOL/L (ref 99–110)
CO2: 21 MMOL/L (ref 21–32)
CREAT SERPL-MCNC: 1.1 MG/DL (ref 0.9–1.3)
EOSINOPHILS ABSOLUTE: 0 K/UL (ref 0–0.6)
EOSINOPHILS RELATIVE PERCENT: 0 %
GFR AFRICAN AMERICAN: >60
GFR NON-AFRICAN AMERICAN: >60
GLUCOSE BLD-MCNC: 144 MG/DL (ref 70–99)
HCT VFR BLD CALC: 40.1 % (ref 40.5–52.5)
HEMOGLOBIN: 14.2 G/DL (ref 13.5–17.5)
LYMPHOCYTES ABSOLUTE: 1.3 K/UL (ref 1–5.1)
LYMPHOCYTES RELATIVE PERCENT: 11 %
MCH RBC QN AUTO: 31.1 PG (ref 26–34)
MCHC RBC AUTO-ENTMCNC: 35.5 G/DL (ref 31–36)
MCV RBC AUTO: 87.7 FL (ref 80–100)
MONOCYTES ABSOLUTE: 0.7 K/UL (ref 0–1.3)
MONOCYTES RELATIVE PERCENT: 6.3 %
NEUTROPHILS ABSOLUTE: 9.5 K/UL (ref 1.7–7.7)
NEUTROPHILS RELATIVE PERCENT: 82.6 %
PDW BLD-RTO: 13.8 % (ref 12.4–15.4)
PLATELET # BLD: 203 K/UL (ref 135–450)
PMV BLD AUTO: 7.4 FL (ref 5–10.5)
POTASSIUM REFLEX MAGNESIUM: 4.8 MMOL/L (ref 3.5–5.1)
RBC # BLD: 4.57 M/UL (ref 4.2–5.9)
SODIUM BLD-SCNC: 138 MMOL/L (ref 136–145)
WBC # BLD: 11.4 K/UL (ref 4–11)

## 2021-08-17 PROCEDURE — 80048 BASIC METABOLIC PNL TOTAL CA: CPT

## 2021-08-17 PROCEDURE — 6370000000 HC RX 637 (ALT 250 FOR IP): Performed by: UROLOGY

## 2021-08-17 PROCEDURE — 85025 COMPLETE CBC W/AUTO DIFF WBC: CPT

## 2021-08-17 PROCEDURE — 36415 COLL VENOUS BLD VENIPUNCTURE: CPT

## 2021-08-17 RX ORDER — SULFAMETHOXAZOLE AND TRIMETHOPRIM 800; 160 MG/1; MG/1
1 TABLET ORAL 2 TIMES DAILY
Qty: 14 TABLET | Refills: 0 | Status: SHIPPED | OUTPATIENT
Start: 2021-08-17 | End: 2021-08-24

## 2021-08-17 RX ADMIN — METHOCARBAMOL 500 MG: 500 TABLET ORAL at 08:16

## 2021-08-17 RX ADMIN — GABAPENTIN 600 MG: 300 CAPSULE ORAL at 08:15

## 2021-08-17 RX ADMIN — FINASTERIDE 5 MG: 5 TABLET, FILM COATED ORAL at 08:16

## 2021-08-17 RX ADMIN — OXYCODONE AND ACETAMINOPHEN 1 TABLET: 10; 325 TABLET ORAL at 08:16

## 2021-08-17 ASSESSMENT — PAIN SCALES - GENERAL
PAINLEVEL_OUTOF10: 7
PAINLEVEL_OUTOF10: 7
PAINLEVEL_OUTOF10: 3

## 2021-08-17 ASSESSMENT — PAIN DESCRIPTION - PAIN TYPE: TYPE: SURGICAL PAIN

## 2021-08-17 ASSESSMENT — PAIN DESCRIPTION - ORIENTATION: ORIENTATION: LEFT

## 2021-08-17 ASSESSMENT — PAIN DESCRIPTION - LOCATION: LOCATION: SCROTUM

## 2021-08-17 NOTE — PROGRESS NOTES
Urology Attending Progress Note      Subjective: Pain much improved since surgery last night. No issues voiding. Vitals:  /65   Pulse 85   Temp 97.5 °F (36.4 °C) (Oral)   Resp 16   Ht 6' (1.829 m)   Wt 190 lb (86.2 kg)   SpO2 98%   BMI 25.77 kg/m²   Temp  Av.7 °F (35.9 °C)  Min: 91.2 °F (32.9 °C)  Max: 98.5 °F (36.9 °C)    Intake/Output Summary (Last 24 hours) at 2021 0906  Last data filed at 2021 0024  Gross per 24 hour   Intake 1333.43 ml   Output 400 ml   Net 933.43 ml       Exam: L scrotum swollen with ecchymosis. Incision CDI. Labs:  WBC:    Lab Results   Component Value Date    WBC 11.4 2021     Hemoglobin/Hematocrit:    Lab Results   Component Value Date    HGB 14.2 2021    HCT 40.1 2021     BMP:    Lab Results   Component Value Date     2021    K 4.8 2021     2021    CO2 21 2021    BUN 18 2021    LABALBU 4.1 2017    CREATININE 1.1 2021    CALCIUM 8.9 2021    GFRAA >60 2021    LABGLOM >60 2021     PT/INR:    Lab Results   Component Value Date    PROTIME 11.8 2021    INR 1.04 2021     PTT:    Lab Results   Component Value Date    APTT 42.2 2021   [APTT    Impression/Plan: 44 yo M with testicular hematoma POD#1 sp left scrotal exploration with hematoma evacuation.  -Pain improved this morning, patient wanting to go home  -Scrotal exam with swelling and ecchymosis, expected after surgery. Incision CDI. No abnormal findings on exam.  -Labs WNL  -He will need to use ice, compression and scrotal elevation to help alleviate pain and swelling once discharged. Explained to him that swelling will take some time to resolve. He understands and will see us as outpatient in 1-2 weeks for post-op check.   -Ok to DC from our standpoint. Please call with any questions.     Reford NEVA Castro

## 2021-08-17 NOTE — ANESTHESIA PRE PROCEDURE
Department of Anesthesiology  Preprocedure Note       Name:  Ashley Fritz   Age:  43 y.o.  :  1985                                          MRN:  [id number]         Date:  2021      Surgeon: Damon Caal):  Marlen De Leon MD    Procedure: Procedure(s):  LEFT SCROTAL EXPLORATION WITH POSSIBLE HEMATOMA EVACUATION AND POSSIBLE TUNICA REPAIR. Medications prior to admission:   Prior to Admission medications    Medication Sig Start Date End Date Taking? Authorizing Provider   oxyCODONE-acetaminophen (PERCOCET) 5-325 MG per tablet Take 1 tablet by mouth every 4 hours as needed for Pain. Yes Historical Provider, MD   dutasteride (AVODART) 0.5 MG capsule Take 0.5 mg by mouth daily    Yes Historical Provider, MD   traMADol (ULTRAM) 50 MG tablet Take 50 mg by mouth every 4 hours as needed for Pain. Yes Historical Provider, MD   tadalafil (CIALIS) 20 MG tablet Take 20 mg by mouth as needed for Erectile Dysfunction    Yes Historical Provider, MD   methocarbamol (ROBAXIN) 500 MG tablet Take 500 mg by mouth 2 times daily    Yes Historical Provider, MD   gabapentin (NEURONTIN) 300 MG capsule Take 600 mg by mouth 3 times daily.     Yes Historical Provider, MD   metFORMIN (GLUCOPHAGE) 500 MG tablet Take 500 mg by mouth 2 times daily (with meals)    Yes Historical Provider, MD       Current medications:    Current Facility-Administered Medications   Medication Dose Route Frequency Provider Last Rate Last Admin    finasteride (PROSCAR) tablet 5 mg  5 mg Oral Daily Cayetano Ruelas MD   5 mg at 21 1505    gabapentin (NEURONTIN) capsule 600 mg  600 mg Oral TID Cayetano Ruelas MD   600 mg at 21 1505    methocarbamol (ROBAXIN) tablet 500 mg  500 mg Oral BID Cayetano Ruelas MD   500 mg at 21 1505    sodium chloride flush 0.9 % injection 5-40 mL  5-40 mL Intravenous 2 times per day Cayetano Ruelas MD        sodium chloride flush 0.9 % injection 5-40 mL  5-40 mL Intravenous PRN MD Mirna Yao 0.9 % sodium chloride infusion  25 mL Intravenous PRN Jennifer Marr MD        ondansetron (ZOFRAN-ODT) disintegrating tablet 4 mg  4 mg Oral Q8H PRN Jennifer Marr MD        Or    ondansetron (ZOFRAN) injection 4 mg  4 mg Intravenous Q6H PRN Jennifer Marr MD        polyethylene glycol (GLYCOLAX) packet 17 g  17 g Oral Daily PRN Jennifer Marr MD        acetaminophen (TYLENOL) tablet 650 mg  650 mg Oral Q6H PRN Jennifer Marr MD        Or   Ardyth Moritz acetaminophen (TYLENOL) suppository 650 mg  650 mg Rectal Q6H PRN Jennifer Marr MD        naloxone [facility]) injection 0.4 mg  0.4 mg Intravenous PRN Jennifer Marr MD        lactated ringers infusion   Intravenous Continuous Jennifer Marr  mL/hr at 08/16/21 1534 New Bag at 08/16/21 1534    HYDROmorphone (DILAUDID) injection 0.5 mg  0.5 mg Intravenous Q3H PRN Corey Miller MD        Or    HYDROmorphone (DILAUDID) injection 1 mg  1 mg Intravenous Q3H PRN Corey Miller MD   1 mg at 08/16/21 1911     Facility-Administered Medications Ordered in Other Encounters   Medication Dose Route Frequency Provider Last Rate Last Admin    dexmedetomidine (PRECEDEX) 200 mcg in sodium chloride 0.9 % 50 mL IV bolus   Intravenous Continuous PRN Rossy Prom, APRN - CRNA   50 mcg at 08/16/21 2013    HYDROmorphone (DILAUDID) injection   Intravenous PRN Rossy Prom, APRN - CRNA   1 mg at 08/16/21 2042    ondansetron (ZOFRAN) injection   Intravenous PRN Rossy Prom, APRN - CRNA   4 mg at 08/16/21 2022    dexamethasone (DECADRON) injection   Intravenous PRN Rossy Prom, APRN - CRNA   4 mg at 08/16/21 2022    lidocaine injection   Intravenous PRN Rossy Prom, APRN - CRNA   100 mg at 08/16/21 2020    propofol injection   Intravenous PRN Rossy Prom, APRN - CRNA   400 mg at 08/16/21 2020    0.9 % sodium chloride infusion   Intravenous Continuous PRN Rossy Prom, APRN - CRNA   New Bag at 08/16/21 2016       Allergies:  No Known Allergies    Problem List:    Patient Active Problem List   Diagnosis Code    Post traumatic stress disorder F43.10    Anxiety F41.9    Insomnia G47.00    Cervical pain (neck) M54.2    Hematoma of testis N50.1       Past Medical History:        Diagnosis Date    Back pain     Complex regional pain syndrome     Insomnia     Kidney disease     Neck pain     Post traumatic stress disorder        Past Surgical History:        Procedure Laterality Date    ARM SURGERY      upper arm/elbow surgery    HERNIA REPAIR         Social History:    Social History     Tobacco Use    Smoking status: Never Smoker    Smokeless tobacco: Never Used    Tobacco comment: Cigar per year   Substance Use Topics    Alcohol use: No                                Counseling given: Not Answered  Comment: Cigar per year      Vital Signs (Current):   Vitals:    08/16/21 0819 08/16/21 1104 08/16/21 1432 08/16/21 1911   BP: 125/72 129/88 (!) 142/78 (!) 144/78   Pulse: 79 93 63 108   Resp: 22 26 20 22   Temp: 98.6 °F (37 °C)  97.6 °F (36.4 °C)    TempSrc: Oral  Oral    SpO2: 99% 100% 100%    Weight: 190 lb (86.2 kg)      Height: 6' (1.829 m)                                                 BP Readings from Last 3 Encounters:   08/16/21 (!) 144/78   08/16/21 (!) 101/55   07/31/18 118/68       NPO Status:                                                                                 BMI:   Wt Readings from Last 3 Encounters:   08/16/21 190 lb (86.2 kg)   07/31/18 172 lb (78 kg)   04/02/17 195 lb (88.5 kg)     Body mass index is 25.77 kg/m².     CBC:   Lab Results   Component Value Date    WBC 10.0 08/16/2021    RBC 4.60 08/16/2021    HGB 14.3 08/16/2021    HCT 40.8 08/16/2021    MCV 88.9 08/16/2021    RDW 13.7 08/16/2021     08/16/2021       CMP:   Lab Results   Component Value Date     08/16/2021    K 4.3 08/16/2021     08/16/2021    CO2 34 08/16/2021    BUN 17 08/16/2021    CREATININE 1.0 08/16/2021    GFRAA >60 08/16/2021    AGRATIO 1.1 04/02/2017    LABGLOM >60 08/16/2021    GLUCOSE 86 08/16/2021    PROT 7.8 04/02/2017    CALCIUM 8.9 08/16/2021    BILITOT 0.5 04/02/2017    ALKPHOS 75 04/02/2017    AST 25 04/02/2017    ALT 28 04/02/2017       POC Tests: No results for input(s): POCGLU, POCNA, POCK, POCCL, POCBUN, POCHEMO, POCHCT in the last 72 hours. Coags:   Lab Results   Component Value Date    PROTIME 11.8 08/16/2021    INR 1.04 08/16/2021    APTT 42.2 08/16/2021       HCG (If Applicable): No results found for: PREGTESTUR, PREGSERUM, HCG, HCGQUANT     ABGs: No results found for: PHART, PO2ART, UBY0SBC, SUO5VOC, BEART, [id number]     Type & Screen (If Applicable):  No results found for: LABABO, LABRH    Drug/Infectious Status (If Applicable):  No results found for: HIV, HEPCAB    COVID-19 Screening (If Applicable): No results found for: COVID19        Anesthesia Evaluation  Patient summary reviewed and Nursing notes reviewed  Airway: Mallampati: II  TM distance: >3 FB   Neck ROM: full  Mouth opening: > = 3 FB Dental: normal exam         Pulmonary:Negative Pulmonary ROS and normal exam  breath sounds clear to auscultation                             Cardiovascular:Negative CV ROS  Exercise tolerance: good (>4 METS),           Rhythm: regular  Rate: normal           Beta Blocker:  Not on Beta Blocker         Neuro/Psych:   (+) seizures: well controlled and no interval change, psychiatric history: stable with treatment   (-) neuromuscular disease           GI/Hepatic/Renal: Neg GI/Hepatic/Renal ROS            Endo/Other: Negative Endo/Other ROS                    Abdominal:       Abdomen: soft. Vascular: negative vascular ROS. Other Findings:             Anesthesia Plan      general     ASA 2       Induction: intravenous. MIPS: Postoperative opioids intended and Prophylactic antiemetics administered. Anesthetic plan and risks discussed with patient.     Use of blood products discussed with patient whom consented to blood products. Plan discussed with attending and CRNA.     Attending anesthesiologist reviewed and agrees with Preprocedure content              Ignacio Morrell DO   8/16/2021

## 2021-08-17 NOTE — PLAN OF CARE
Problem: Pain:  Goal: Pain level will decrease  Description: Pain level will decrease  Note: Patient has complained of pain rated 7/10 in scrotal area. Prn pain medication given with benefit. Will continue ot monitor for further pain needs.

## 2021-08-17 NOTE — PROGRESS NOTES
Pt arrived from PACU around 2250. VSS. Pt alert & oriented x4. Pt reports pain is 2-3 and denies need for pain medication at this time. Wife to room for 1 hour post-surgery. Pt tolerated sandwich without any nausea. Pt assisted oob to BR and voided 400 ml rosa urine without issue. Scrotal support in place. No drainage noted. Will continue to monitor.   Electronically signed by Jessie Robles RN on 8/16/21 at 11:32 PM EDT

## 2021-08-17 NOTE — PROGRESS NOTES
Patient admitted to PACU. Post op    Room / Location: [address] [place] [address] / [facility]   Anesthesia Start: 2003 Anesthesia Stop: 2143   Procedure: LEFT SCROTAL EXPLORATION (Left ) Diagnosis: (LEFT SCROTAL SWELLING)   Surgeons: Eduar Crowe MD Responsible Provider: Brant Lion DO   Anesthesia Type: general ASA Status     Report received from anesthesia personnel- no problems noted during the case. Patient drowsy post op- denies any pain or nausea. Scrotal support in place- no drainage noted. Good peripheral pulses present.

## 2021-08-17 NOTE — DISCHARGE SUMMARY
Discharge Summary    Date:8/17/2021        Patient Buckley Ormond     YOB: 1985     Age:36 y.o. Admit Date:8/16/2021   Admission Condition:fair   Discharged Condition:good  Discharge Date: 08/17/21    Discharge Diagnoses   Active Problems:    Hematoma of testis  Resolved Problems:    * No resolved hospital problems. [facility] Stay   Narrative of Hospital Course:   68-year-old male admitted to the hospital after softball injury to the testicles and a testicular hematoma. Patient underwent scrotal exploration which found hematoma. Postoperatively patient did not require any IV pain medications. I have discussed with the urology nurse practitioner she is okay with the discharge. I have discussed with the urology, they recommended Bactrim for 1 week as empiric antibiotic for prophylaxis. Patient will need outpatient follow-up with urology. On dc Patient denies any CP,SOB,Denies any nausea, vomiting, abdominal pain. Denies any diarrhea. Patient denies any palpitations, lightheadedness, orthopnea, PND. Patient doesn't appear to be in any distress on discharge . Physical exam   Vitals:    08/16/21 2230 08/16/21 2256 08/17/21 0309 08/17/21 0807   BP: 107/62 114/68 (!) 113/57 126/65   Pulse: 79 82 80 85   Resp: 17 16 16 16   Temp: 98.5 °F (36.9 °C) 97.9 °F (36.6 °C) 97.2 °F (36.2 °C) 97.5 °F (36.4 °C)   TempSrc:  Oral Oral Oral   SpO2: 94% 97%  98%   Weight:       Height:             Physical Exam  Constitutional:       Appearance: Normal appearance. HENT:      Head: Normocephalic and atraumatic. Cardiovascular:      Rate and Rhythm: Normal rate and regular rhythm. Pulses: Normal pulses. Heart sounds: Normal heart sounds. Pulmonary:      Effort: Pulmonary effort is normal.      Breath sounds: Normal breath sounds. Abdominal:      General: Abdomen is flat.    Genitourinary:     Comments: Some bruising over the left testicle on scrotum seen   Skin:     General: Skin is warm and dry. Capillary Refill: Capillary refill takes less than 2 seconds. Neurological:      General: No focal deficit present. Mental Status: He is alert and oriented to person, place, and time. Psychiatric:         Mood and Affect: Mood normal.         Behavior: Behavior normal.           Consultants:   IP CONSULT TO UROLOGY  IP CONSULT TO HOSPITALIST  IP CONSULT TO PHARMACY    Time Spent on Discharge:  45 minutes were spent in patient examination, evaluation, counseling as well as medication reconciliation, prescriptions for required medications, discharge plan and follow up. Surgeries/Procedures Performed:  Procedure(s):  LEFT SCROTAL EXPLORATION          Significant Diagnostic Studies:   Recent Labs:  CBC:   Recent Labs     08/16/21  1140 08/17/21  0518   WBC 10.0 11.4*   HGB 14.3 14.2   HCT 40.8 40.1*   MCV 88.9 87.7    203     BMP:   Recent Labs     08/16/21  1140 08/17/21  0518    138   K 4.3 4.8    103   CO2 34* 21   BUN 17 18   CREATININE 1.0 1.1     Mag: No results found for: MG  LIVER PROFILE: No results for input(s): AST, ALT, LIPASE, BILIDIR, BILITOT, ALKPHOS in the last 72 hours. Invalid input(s): AMYLASE,  ALB  PT/INR:   Recent Labs     08/16/21  1140   PROTIME 11.8   INR 1.04     APTT:   Recent Labs     08/16/21  1140   APTT 42.2*     BNP:  No results for input(s): BNP in the last 72 hours. CARDIAC ENZYMES: No results for input(s): CKTOTAL, CKMB, TROPONINI in the last 72 hours. Radiology Last 7 Days:  [address]   Final Result      1. Focal area of heterogeneity in the left testicle likely representing testicular hematoma given the history of trauma. 2.  Focal contour bulge at the location of heterogeneity can be an indirect sign of tunica rupture. 3.  Heterogeneous enlarged epididymis suggesting additional epididymal injury. 4.  Hemorrhagic left scrotal fluid and scrotal hematoma. 5.  No evidence of torsion.          US DUP ABD PEL RETRO SCROT COMPLETE   Final Result      1. Focal area of heterogeneity in the left testicle likely representing testicular hematoma given the history of trauma. 2.  Focal contour bulge at the location of heterogeneity can be an indirect sign of tunica rupture. 3.  Heterogeneous enlarged epididymis suggesting additional epididymal injury. 4.  Hemorrhagic left scrotal fluid and scrotal hematoma. 5.  No evidence of torsion. Discharge Plan   Disposition: Home    Provider Follow-Up:   Ozzie Agosto MD  [address]  [phone]    In 1 week  follow up     PCP    In 1 week  follow up        Hospital/Incidental Findings Requiring Follow-Up:  [address]   Final Result      1. Focal area of heterogeneity in the left testicle likely representing testicular hematoma given the history of trauma. 2.  Focal contour bulge at the location of heterogeneity can be an indirect sign of tunica rupture. 3.  Heterogeneous enlarged epididymis suggesting additional epididymal injury. 4.  Hemorrhagic left scrotal fluid and scrotal hematoma. 5.  No evidence of torsion. US DUP ABD PEL RETRO SCROT COMPLETE   Final Result      1. Focal area of heterogeneity in the left testicle likely representing testicular hematoma given the history of trauma. 2.  Focal contour bulge at the location of heterogeneity can be an indirect sign of tunica rupture. 3.  Heterogeneous enlarged epididymis suggesting additional epididymal injury. 4.  Hemorrhagic left scrotal fluid and scrotal hematoma. 5.  No evidence of torsion.                    Discharge Medications         Medication List      START taking these medications    sulfamethoxazole-trimethoprim 800-160 MG per tablet  Commonly known as: BACTRIM DS;SEPTRA DS  Take 1 tablet by mouth 2 times daily for 7 days        CONTINUE taking these medications    dutasteride 0.5 MG capsule  Commonly known as: AVODART

## 2021-08-17 NOTE — PROGRESS NOTES
Patient discharge education complete. IV removed and dressing placed. Patient verbalized understanding of medications and side effects at time of discharge. All questions answered at this time.      Electronically signed by Mohamud Jose RN on 8/17/2021 at 10:28 AM

## 2021-08-17 NOTE — PROGRESS NOTES
Patient is alert and oriented x4. VSS. Surgical site CDI with no drainage or surrounding redness noted, area remains inflamed. Pain controlled with PO prn pain medication. Tolerating diet with no nausea. Urinating with no issues. Patient denies any other needs at this time. Bed is in the lowest position, call light and bedside table within reach. Will continue to monitor for changes in patient status.      Electronically signed by Mohamud Jose RN on 8/17/2021 at 9:30 AM

## 2021-08-17 NOTE — CARE COORDINATION
Pt from home, IPTA. Urology consulted. Planning surgery this evening for hematoma evacuation and possible tunica repair. Will follow for possible needs post-op.
or delivery - cash, check, or card accepted)     Able to afford home medications/ co-pay costs: Yes    ADLS:  Current PT AM-PAC Score:   /24  Current OT AM-PAC Score:   /24      DISCHARGE Disposition: Home- No Services Needed    LOC at discharge: Not Applicable  ANDRAE Completed: Not Indicated    Notification completed in HENS/PAS?:  Not Applicable    IMM Completed:   Not Indicated    Transportation:  Transportation PLAN for discharge: family   Mode of Transport: [address] ordered at discharge: Not [address]: Not Applicable  Orders faxed: No    Durable Medical Equipment:  DME Provider: none  Equipment obtained during hospitalization:     Home Oxygen and Respiratory Equipment:  Oxygen needed at discharge?: Not [address]: Not Applicable  Portable tank available for discharge?: Not Indicated    Dialysis:  Dialysis patient: No    Dialysis Center:  Not Applicable      Additional CM Notes: Pt from home no needs at DC, will follow up with Urology OP    The Plan for Transition of Care is related to the following treatment goals of Testicular fracture, initial encounter [S31.30XA]  Hematoma of testis [N50.1]    The Patient and/or patient representative Alessandra Mtz and his family were provided with a choice of provider and agrees with the discharge plan Not Indicated    Freedom of choice list was provided with basic dialogue that supports the patient's individualized plan of care/goals and shares the quality data associated with the providers.  Not Indicated    Care Transitions patient: No    Zackary Melendez RN  [facility]  Case Management Department  Ph: [phone]  Fax: [phone]

## 2021-08-17 NOTE — PROGRESS NOTES
PACU Transfer Note    Vitals:    08/16/21 2230   BP: 107/62   Pulse: 79   Resp: 17   Temp: 98.5 °F (36.9 °C)   SpO2: 94%       No intake/output data recorded. Pain assessment:  VS stable. Pain level 5/10 tolerable/. Report called to [employer]. Patient to transfer to room # [id number] as scheduled.    Pain Level: 5    Report given to Receiving unit RN.    8/16/2021 10:42 PM

## 2021-08-17 NOTE — PLAN OF CARE
Problem: Pain:  Goal: Control of acute pain  Description: Control of acute pain  Outcome: Ongoing   Pt has been sleeping for intervals since returning from surgery. Pt reports pain is a 2-3 and declines offer for pain medications. Pt aware of prn meds. Will continue to monitor.

## 2021-08-24 NOTE — OP NOTE
DATE OF SURGERY: 8/16/2021  SURGEON: Ozzie Agosto MD    PRE OP DIAGNOSIS: Possible left testis rupture    POST OP DIAGNOSIS: Left testicular hematoma    PROCEDURES PERFORMED:  Left hemiscrotal exploration  Left spermatic cord block    INDICATIONS FOR PROCEDURE: The patient is a 43 y.o. male who was hit in the scrotum by a baseball. He presented to the emergency Department with severe pain. Scrotal sono shows concern for a left testis rupture. I have counseled him for operative exploration with possible repair vs orchiectomy. He wishes to proceed. DESCRIPTION OF PROCEDURE:  After informed consent the patient was taken to the operating room and placed under general endotracheal anesthesia. The patient was prepped and draped in the standard surgical fashion in supine position. A left hemiscrotal incision was made sharply with a knife. The incision was carried through the subcutaneous tissues with bovie electrocautery. The testis and epididymis were identified and delivered out through the incision. The tunica vaginalis was entered using jon. There was drainage of thin, blood tinged fluid. The tunica vaginalis was fully opened. The testis and epididymis were evaluated. I noted an intratesticular hematoma inferiorly, without any evidence of violation of the tunica albiguinea. There was also a small hematoma at the tail of the epididymis, but the testis and epididymis were completely intact. Due to severe pain, I did perform a cord block using Exparel. I diluted Exparel to 60mL and injected 40mL at 3 different locations in the left spermatic cord. I then replaced the testis and epididymis into the scrotum with the lateral sulcus of the epididymis facing laterally. The scrotum was copiously irrigated. The dartos fascia was then closed using a running chromic suture. The skin was closed using a running chromic suture. It should be noted that I injected the remaining Exparel in the skin and subcutaneous tissues. The patient tolerated the procedure well and post-operatively was transferred to the PACU in stable condition, extubated.       Pre-Op Antibiotic: Ancef 2g IV once   EBL: 5 mL  Complications: None     Specimen: None       Post Op Plan:   1) Will need outpatient FU in 2 weeks for wound check      Electronically signed by Eve Toscano MD on 8/24/2021 at 10:48 AM

## 2021-09-07 ENCOUNTER — HOSPITAL ENCOUNTER (OUTPATIENT)
Dept: ULTRASOUND IMAGING | Age: 36
Discharge: HOME | End: 2021-09-07
Payer: OTHER GOVERNMENT

## 2021-09-07 DIAGNOSIS — N50.1 VASCULAR DISORDER OF MALE GENITAL ORGANS: ICD-10-CM

## 2021-09-07 PROCEDURE — 76870 US EXAM SCROTUM: CPT

## 2021-11-15 ENCOUNTER — CLINICAL DOCUMENTATION (OUTPATIENT)
Dept: OTHER | Age: 36
End: 2021-11-15

## 2021-12-15 ENCOUNTER — CLINICAL DOCUMENTATION (OUTPATIENT)
Dept: OTHER | Age: 36
End: 2021-12-15

## 2025-02-24 ENCOUNTER — HOSPITAL ENCOUNTER (OUTPATIENT)
Age: 40
Setting detail: OBSERVATION
LOS: 1 days | Discharge: HOME | End: 2025-02-28
Attending: EMERGENCY MEDICINE | Admitting: PSYCHIATRY & NEUROLOGY
Payer: OTHER GOVERNMENT
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
## 2025-02-24 LAB
ANION GAP SERPL CALCULATED.3IONS-SCNC: 12 MMOL/L (ref 3–16)
APAP SERPL-MCNC: <5 UG/ML (ref 10–30)
BASOPHILS # BLD: 0 K/UL (ref 0–0.2)
BASOPHILS NFR BLD: 0.1 %
BILIRUB UR QL STRIP.AUTO: NEGATIVE
BUN SERPL-MCNC: 14 MG/DL (ref 7–20)
CALCIUM SERPL-MCNC: 8.3 MG/DL (ref 8.3–10.6)
CHLORIDE SERPL-SCNC: 101 MMOL/L (ref 99–110)
CLARITY UR: CLEAR
CO2 SERPL-SCNC: 26 MMOL/L (ref 21–32)
COLOR UR: YELLOW
CREAT SERPL-MCNC: 1.6 MG/DL (ref 0.9–1.3)
DEPRECATED RDW RBC AUTO: 14.8 % (ref 12.4–15.4)
EOSINOPHIL # BLD: 0 K/UL (ref 0–0.6)
EOSINOPHIL NFR BLD: 0.6 %
ETHANOLAMINE SERPL-MCNC: NORMAL MG/DL (ref 0–0.08)
GFR SERPLBLD CREATININE-BSD FMLA CKD-EPI: 55 ML/MIN/{1.73_M2}
GLUCOSE SERPL-MCNC: 73 MG/DL (ref 70–99)
GLUCOSE UR STRIP.AUTO-MCNC: NEGATIVE MG/DL
HCT VFR BLD AUTO: 47.8 % (ref 40.5–52.5)
HGB BLD-MCNC: 16.4 G/DL (ref 13.5–17.5)
HGB UR QL STRIP.AUTO: NEGATIVE
KETONES UR STRIP.AUTO-MCNC: 15 MG/DL
LEUKOCYTE ESTERASE UR QL STRIP.AUTO: NEGATIVE
LYMPHOCYTES # BLD: 2 K/UL (ref 1–5.1)
LYMPHOCYTES NFR BLD: 26.1 %
MCH RBC QN AUTO: 30.8 PG (ref 26–34)
MCHC RBC AUTO-ENTMCNC: 34.4 G/DL (ref 31–36)
MCV RBC AUTO: 89.5 FL (ref 80–100)
MONOCYTES # BLD: 0.6 K/UL (ref 0–1.3)
MONOCYTES NFR BLD: 7.5 %
NEUTROPHILS # BLD: 5.1 K/UL (ref 1.7–7.7)
NEUTROPHILS NFR BLD: 65.7 %
NITRITE UR QL STRIP.AUTO: NEGATIVE
PH UR STRIP.AUTO: 6 [PH] (ref 5–8)
PLATELET # BLD AUTO: 217 K/UL (ref 135–450)
PMV BLD AUTO: 7.2 FL (ref 5–10.5)
POTASSIUM SERPL-SCNC: 4 MMOL/L (ref 3.5–5.1)
PROT UR STRIP.AUTO-MCNC: NEGATIVE MG/DL
RBC # BLD AUTO: 5.34 M/UL (ref 4.2–5.9)
SALICYLATES SERPL-MCNC: <0.5 MG/DL (ref 15–30)
SODIUM SERPL-SCNC: 139 MMOL/L (ref 136–145)
SP GR UR STRIP.AUTO: >=1.03 (ref 1–1.03)
UA DIPSTICK W REFLEX MICRO PNL UR: ABNORMAL
URN SPEC COLLECT METH UR: ABNORMAL
UROBILINOGEN UR STRIP-ACNC: 0.2 E.U./DL
WBC # BLD AUTO: 7.7 K/UL (ref 4–11)

## 2025-02-24 PROCEDURE — 85025 COMPLETE CBC W/AUTO DIFF WBC: CPT

## 2025-02-24 PROCEDURE — 80143 DRUG ASSAY ACETAMINOPHEN: CPT

## 2025-02-24 PROCEDURE — 82077 ASSAY SPEC XCP UR&BREATH IA: CPT

## 2025-02-24 PROCEDURE — 99285 EMERGENCY DEPT VISIT HI MDM: CPT

## 2025-02-24 PROCEDURE — 80048 BASIC METABOLIC PNL TOTAL CA: CPT

## 2025-02-24 PROCEDURE — 84443 ASSAY THYROID STIM HORMONE: CPT

## 2025-02-24 PROCEDURE — 80179 DRUG ASSAY SALICYLATE: CPT

## 2025-02-24 PROCEDURE — 80307 DRUG TEST PRSMV CHEM ANLYZR: CPT

## 2025-02-24 PROCEDURE — 36415 COLL VENOUS BLD VENIPUNCTURE: CPT

## 2025-02-24 PROCEDURE — 81003 URINALYSIS AUTO W/O SCOPE: CPT

## 2025-02-24 ASSESSMENT — LIFESTYLE VARIABLES
HOW MANY STANDARD DRINKS CONTAINING ALCOHOL DO YOU HAVE ON A TYPICAL DAY: PATIENT DOES NOT DRINK
HOW OFTEN DO YOU HAVE A DRINK CONTAINING ALCOHOL: NEVER

## 2025-02-24 ASSESSMENT — PAIN - FUNCTIONAL ASSESSMENT: PAIN_FUNCTIONAL_ASSESSMENT: NONE - DENIES PAIN
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
## 2025-02-25 PROBLEM — N17.9 AKI (ACUTE KIDNEY INJURY): Status: ACTIVE | Noted: 2025-02-25

## 2025-02-25 PROBLEM — G40.909 SEIZURE DISORDER (HCC): Status: ACTIVE | Noted: 2025-02-25

## 2025-02-25 PROBLEM — N18.9 CHRONIC KIDNEY DISEASE: Status: ACTIVE | Noted: 2025-02-25

## 2025-02-25 PROBLEM — F32.A DEPRESSIVE DISORDER: Status: ACTIVE | Noted: 2025-02-25

## 2025-02-25 PROBLEM — I10 PRIMARY HYPERTENSION: Status: ACTIVE | Noted: 2025-02-25

## 2025-02-25 LAB
AMPHETAMINES UR QL SCN>1000 NG/ML: ABNORMAL
BARBITURATES UR QL SCN>200 NG/ML: ABNORMAL
BENZODIAZ UR QL SCN>200 NG/ML: ABNORMAL
CANNABINOIDS UR QL SCN>50 NG/ML: ABNORMAL
COCAINE UR QL SCN: ABNORMAL
DRUG SCREEN COMMENT UR-IMP: ABNORMAL
EKG ATRIAL RATE: 63 BPM
EKG DIAGNOSIS: NORMAL
EKG P AXIS: 66 DEGREES
EKG P-R INTERVAL: 202 MS
EKG Q-T INTERVAL: 398 MS
EKG QRS DURATION: 84 MS
EKG QTC CALCULATION (BAZETT): 407 MS
EKG R AXIS: 30 DEGREES
EKG T AXIS: 49 DEGREES
EKG VENTRICULAR RATE: 63 BPM
FENTANYL SCREEN, URINE: ABNORMAL
FLUAV RNA RESP QL NAA+PROBE: NOT DETECTED
FLUBV RNA RESP QL NAA+PROBE: NOT DETECTED
METHADONE UR QL SCN>300 NG/ML: POSITIVE
OPIATES UR QL SCN>300 NG/ML: ABNORMAL
OXYCODONE UR QL SCN: ABNORMAL
PCP UR QL SCN>25 NG/ML: POSITIVE
PH UR STRIP: 6 [PH]
SARS-COV-2 RNA RESP QL NAA+PROBE: NOT DETECTED
TSH SERPL DL<=0.005 MIU/L-ACNC: 1.85 UIU/ML (ref 0.27–4.2)

## 2025-02-25 PROCEDURE — 93005 ELECTROCARDIOGRAM TRACING: CPT | Performed by: PSYCHIATRY & NEUROLOGY

## 2025-02-25 PROCEDURE — G0378 HOSPITAL OBSERVATION PER HR: HCPCS

## 2025-02-25 PROCEDURE — 99222 1ST HOSP IP/OBS MODERATE 55: CPT

## 2025-02-25 PROCEDURE — 6370000000 HC RX 637 (ALT 250 FOR IP)

## 2025-02-25 PROCEDURE — 87636 SARSCOV2 & INF A&B AMP PRB: CPT

## 2025-02-25 RX ORDER — TRAMADOL HYDROCHLORIDE 50 MG/1
50 TABLET ORAL EVERY 6 HOURS PRN
Status: DISCONTINUED | OUTPATIENT
Start: 2025-02-25 | End: 2025-02-27

## 2025-02-25 RX ORDER — HYDROXYZINE HYDROCHLORIDE 50 MG/1
50 TABLET, FILM COATED ORAL 3 TIMES DAILY PRN
Status: DISCONTINUED | OUTPATIENT
Start: 2025-02-25 | End: 2025-02-25 | Stop reason: SDUPTHER

## 2025-02-25 RX ORDER — METHOCARBAMOL 500 MG/1
500 TABLET, FILM COATED ORAL 2 TIMES DAILY
Status: DISCONTINUED | OUTPATIENT
Start: 2025-02-25 | End: 2025-02-28 | Stop reason: HOSPADM

## 2025-02-25 RX ORDER — LEVETIRACETAM 500 MG/1
500 TABLET ORAL 2 TIMES DAILY
COMMUNITY

## 2025-02-25 RX ORDER — TRAZODONE HYDROCHLORIDE 50 MG/1
50 TABLET ORAL NIGHTLY PRN
Status: DISCONTINUED | OUTPATIENT
Start: 2025-02-25 | End: 2025-02-25 | Stop reason: SDUPTHER

## 2025-02-25 RX ORDER — NICOTINE 21 MG/24HR
1 PATCH, TRANSDERMAL 24 HOURS TRANSDERMAL DAILY
Status: DISCONTINUED | OUTPATIENT
Start: 2025-02-25 | End: 2025-02-25

## 2025-02-25 RX ORDER — ACETAMINOPHEN 325 MG/1
650 TABLET ORAL EVERY 4 HOURS PRN
Status: DISCONTINUED | OUTPATIENT
Start: 2025-02-25 | End: 2025-02-25

## 2025-02-25 RX ORDER — POLYETHYLENE GLYCOL 3350 17 G
2 POWDER IN PACKET (EA) ORAL
Status: DISCONTINUED | OUTPATIENT
Start: 2025-02-25 | End: 2025-02-28 | Stop reason: HOSPADM

## 2025-02-25 RX ORDER — BENZTROPINE MESYLATE 1 MG/ML
2 INJECTION, SOLUTION INTRAMUSCULAR; INTRAVENOUS 2 TIMES DAILY PRN
Status: DISCONTINUED | OUTPATIENT
Start: 2025-02-25 | End: 2025-02-28 | Stop reason: HOSPADM

## 2025-02-25 RX ORDER — TRAMADOL HYDROCHLORIDE 50 MG/1
50 TABLET ORAL ONCE
Status: DISCONTINUED | OUTPATIENT
Start: 2025-02-25 | End: 2025-02-25

## 2025-02-25 RX ORDER — OXYCODONE AND ACETAMINOPHEN 5; 325 MG/1; MG/1
1 TABLET ORAL EVERY 6 HOURS PRN
Status: DISCONTINUED | OUTPATIENT
Start: 2025-02-25 | End: 2025-02-28 | Stop reason: HOSPADM

## 2025-02-25 RX ORDER — TRAMADOL HYDROCHLORIDE 50 MG/1
50 TABLET ORAL EVERY 6 HOURS PRN
COMMUNITY

## 2025-02-25 RX ORDER — FINASTERIDE 5 MG/1
5 TABLET, FILM COATED ORAL DAILY
Status: DISCONTINUED | OUTPATIENT
Start: 2025-02-25 | End: 2025-02-28 | Stop reason: HOSPADM

## 2025-02-25 RX ORDER — OLANZAPINE 10 MG/1
10 TABLET ORAL EVERY 4 HOURS PRN
Status: DISCONTINUED | OUTPATIENT
Start: 2025-02-25 | End: 2025-02-28 | Stop reason: HOSPADM

## 2025-02-25 RX ORDER — POLYETHYLENE GLYCOL 3350 17 G
2 POWDER IN PACKET (EA) ORAL
Status: DISCONTINUED | OUTPATIENT
Start: 2025-02-25 | End: 2025-02-25 | Stop reason: SDUPTHER

## 2025-02-25 RX ORDER — NIFEDIPINE 30 MG
30 TABLET, EXTENDED RELEASE ORAL DAILY
COMMUNITY

## 2025-02-25 RX ORDER — IBUPROFEN 400 MG/1
400 TABLET, FILM COATED ORAL EVERY 6 HOURS PRN
Status: DISCONTINUED | OUTPATIENT
Start: 2025-02-25 | End: 2025-02-25 | Stop reason: SDUPTHER

## 2025-02-25 RX ORDER — LEVETIRACETAM 500 MG/1
500 TABLET ORAL 2 TIMES DAILY
Status: DISCONTINUED | OUTPATIENT
Start: 2025-02-25 | End: 2025-02-28 | Stop reason: HOSPADM

## 2025-02-25 RX ORDER — RAMELTEON 8 MG/1
8 TABLET ORAL NIGHTLY
COMMUNITY

## 2025-02-25 RX ORDER — HYDROXYZINE HYDROCHLORIDE 50 MG/1
50 TABLET, FILM COATED ORAL 3 TIMES DAILY PRN
Status: DISCONTINUED | OUTPATIENT
Start: 2025-02-25 | End: 2025-02-28 | Stop reason: HOSPADM

## 2025-02-25 RX ORDER — ACETAMINOPHEN 325 MG/1
650 TABLET ORAL EVERY 4 HOURS PRN
Status: DISCONTINUED | OUTPATIENT
Start: 2025-02-25 | End: 2025-02-28 | Stop reason: HOSPADM

## 2025-02-25 RX ORDER — METOPROLOL SUCCINATE 50 MG/1
50 TABLET, EXTENDED RELEASE ORAL NIGHTLY
COMMUNITY

## 2025-02-25 RX ORDER — NIFEDIPINE 30 MG/1
30 TABLET, EXTENDED RELEASE ORAL DAILY
Status: DISCONTINUED | OUTPATIENT
Start: 2025-02-25 | End: 2025-02-28 | Stop reason: HOSPADM

## 2025-02-25 RX ORDER — METOPROLOL SUCCINATE 50 MG/1
50 TABLET, EXTENDED RELEASE ORAL NIGHTLY
Status: DISCONTINUED | OUTPATIENT
Start: 2025-02-25 | End: 2025-02-28 | Stop reason: HOSPADM

## 2025-02-25 RX ORDER — BENZTROPINE MESYLATE 1 MG/ML
2 INJECTION, SOLUTION INTRAMUSCULAR; INTRAVENOUS 2 TIMES DAILY PRN
Status: DISCONTINUED | OUTPATIENT
Start: 2025-02-25 | End: 2025-02-25 | Stop reason: SDUPTHER

## 2025-02-25 RX ORDER — IBUPROFEN 400 MG/1
400 TABLET, FILM COATED ORAL EVERY 6 HOURS PRN
Status: DISCONTINUED | OUTPATIENT
Start: 2025-02-25 | End: 2025-02-28 | Stop reason: HOSPADM

## 2025-02-25 RX ORDER — MAGNESIUM HYDROXIDE/ALUMINUM HYDROXICE/SIMETHICONE 120; 1200; 1200 MG/30ML; MG/30ML; MG/30ML
30 SUSPENSION ORAL EVERY 6 HOURS PRN
Status: DISCONTINUED | OUTPATIENT
Start: 2025-02-25 | End: 2025-02-28 | Stop reason: HOSPADM

## 2025-02-25 RX ORDER — NICOTINE 21 MG/24HR
1 PATCH, TRANSDERMAL 24 HOURS TRANSDERMAL DAILY
Status: DISCONTINUED | OUTPATIENT
Start: 2025-02-25 | End: 2025-02-25 | Stop reason: SDUPTHER

## 2025-02-25 RX ORDER — MAGNESIUM HYDROXIDE/ALUMINUM HYDROXICE/SIMETHICONE 120; 1200; 1200 MG/30ML; MG/30ML; MG/30ML
30 SUSPENSION ORAL EVERY 6 HOURS PRN
Status: DISCONTINUED | OUTPATIENT
Start: 2025-02-25 | End: 2025-02-25

## 2025-02-25 RX ORDER — ACETAMINOPHEN 325 MG/1
650 TABLET ORAL EVERY 4 HOURS PRN
Status: DISCONTINUED | OUTPATIENT
Start: 2025-02-25 | End: 2025-02-25 | Stop reason: SDUPTHER

## 2025-02-25 RX ORDER — TRAZODONE HYDROCHLORIDE 50 MG/1
50 TABLET ORAL NIGHTLY PRN
Status: DISCONTINUED | OUTPATIENT
Start: 2025-02-25 | End: 2025-02-28 | Stop reason: HOSPADM

## 2025-02-25 RX ORDER — OLANZAPINE 10 MG/1
10 TABLET ORAL EVERY 4 HOURS PRN
Status: DISCONTINUED | OUTPATIENT
Start: 2025-02-25 | End: 2025-02-25 | Stop reason: SDUPTHER

## 2025-02-25 RX ORDER — GABAPENTIN 300 MG/1
600 CAPSULE ORAL 3 TIMES DAILY
Status: DISCONTINUED | OUTPATIENT
Start: 2025-02-25 | End: 2025-02-28 | Stop reason: HOSPADM

## 2025-02-25 RX ORDER — MAGNESIUM HYDROXIDE/ALUMINUM HYDROXICE/SIMETHICONE 120; 1200; 1200 MG/30ML; MG/30ML; MG/30ML
30 SUSPENSION ORAL EVERY 6 HOURS PRN
Status: DISCONTINUED | OUTPATIENT
Start: 2025-02-25 | End: 2025-02-25 | Stop reason: SDUPTHER

## 2025-02-25 RX ADMIN — LEVETIRACETAM 500 MG: 500 TABLET, FILM COATED ORAL at 21:05

## 2025-02-25 RX ADMIN — METHOCARBAMOL TABLETS 500 MG: 500 TABLET, COATED ORAL at 13:04

## 2025-02-25 RX ADMIN — NIFEDIPINE 30 MG: 30 TABLET, FILM COATED, EXTENDED RELEASE ORAL at 13:04

## 2025-02-25 RX ADMIN — GABAPENTIN 600 MG: 300 CAPSULE ORAL at 21:05

## 2025-02-25 RX ADMIN — LEVETIRACETAM 500 MG: 500 TABLET, FILM COATED ORAL at 13:04

## 2025-02-25 RX ADMIN — FINASTERIDE 5 MG: 5 TABLET, FILM COATED ORAL at 13:04

## 2025-02-25 RX ADMIN — TRAMADOL HYDROCHLORIDE 50 MG: 50 TABLET ORAL at 21:04

## 2025-02-25 RX ADMIN — OXYCODONE HYDROCHLORIDE AND ACETAMINOPHEN 1 TABLET: 5; 325 TABLET ORAL at 22:05

## 2025-02-25 RX ADMIN — METOPROLOL SUCCINATE 50 MG: 50 TABLET, EXTENDED RELEASE ORAL at 21:05

## 2025-02-25 RX ADMIN — GABAPENTIN 600 MG: 300 CAPSULE ORAL at 13:04

## 2025-02-25 RX ADMIN — METHOCARBAMOL TABLETS 500 MG: 500 TABLET, COATED ORAL at 21:04

## 2025-02-25 ASSESSMENT — PAIN DESCRIPTION - DESCRIPTORS
DESCRIPTORS: ACHING;DISCOMFORT
DESCRIPTORS: ACHING
DESCRIPTORS: ACHING;DISCOMFORT

## 2025-02-25 ASSESSMENT — SLEEP AND FATIGUE QUESTIONNAIRES
AVERAGE NUMBER OF SLEEP HOURS: 7
AVERAGE NUMBER OF SLEEP HOURS: 7
DO YOU HAVE DIFFICULTY SLEEPING: NO
DO YOU HAVE DIFFICULTY SLEEPING: NO
DO YOU USE A SLEEP AID: NO
DO YOU USE A SLEEP AID: NO

## 2025-02-25 ASSESSMENT — PAIN SCALES - WONG BAKER
WONGBAKER_NUMERICALRESPONSE: NO HURT

## 2025-02-25 ASSESSMENT — PAIN SCALES - GENERAL
PAINLEVEL_OUTOF10: 7
PAINLEVEL_OUTOF10: 2
PAINLEVEL_OUTOF10: 7
PAINLEVEL_OUTOF10: 6

## 2025-02-25 ASSESSMENT — PATIENT HEALTH QUESTIONNAIRE - PHQ9
2. FEELING DOWN, DEPRESSED OR HOPELESS: NOT AT ALL
SUM OF ALL RESPONSES TO PHQ9 QUESTIONS 1 & 2: 0
SUM OF ALL RESPONSES TO PHQ QUESTIONS 1-9: 0
SUM OF ALL RESPONSES TO PHQ9 QUESTIONS 1 & 2: 0
1. LITTLE INTEREST OR PLEASURE IN DOING THINGS: NOT AT ALL
SUM OF ALL RESPONSES TO PHQ QUESTIONS 1-9: 0
2. FEELING DOWN, DEPRESSED OR HOPELESS: NOT AT ALL
SUM OF ALL RESPONSES TO PHQ QUESTIONS 1-9: 0
1. LITTLE INTEREST OR PLEASURE IN DOING THINGS: NOT AT ALL
SUM OF ALL RESPONSES TO PHQ QUESTIONS 1-9: 0

## 2025-02-25 ASSESSMENT — PAIN DESCRIPTION - PAIN TYPE
TYPE: CHRONIC PAIN
TYPE: CHRONIC PAIN

## 2025-02-25 ASSESSMENT — PAIN DESCRIPTION - LOCATION
LOCATION: BACK
LOCATION: NECK
LOCATION: NECK

## 2025-02-25 ASSESSMENT — PAIN - FUNCTIONAL ASSESSMENT
PAIN_FUNCTIONAL_ASSESSMENT: ACTIVITIES ARE NOT PREVENTED

## 2025-02-25 ASSESSMENT — PAIN DESCRIPTION - ONSET: ONSET: GRADUAL

## 2025-02-25 ASSESSMENT — LIFESTYLE VARIABLES
HOW OFTEN DO YOU HAVE A DRINK CONTAINING ALCOHOL: NEVER
HOW MANY STANDARD DRINKS CONTAINING ALCOHOL DO YOU HAVE ON A TYPICAL DAY: PATIENT DOES NOT DRINK

## 2025-02-25 ASSESSMENT — PAIN DESCRIPTION - ORIENTATION
ORIENTATION: RIGHT
ORIENTATION: LOWER
ORIENTATION: RIGHT

## 2025-02-25 ASSESSMENT — PAIN DESCRIPTION - FREQUENCY: FREQUENCY: INTERMITTENT
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
## 2025-02-25 NOTE — PLAN OF CARE
Problem: Risk for Elopement  Goal: Patient will not exit the unit/facility without proper excort  Outcome: Progressing  Flowsheets  Taken 2/25/2025 0400 by Rhea Griggs, RN  Nursing Interventions for Elopement Risk:   Shoes and clothing collected and placed in gown attire   Reduce environmental triggers   Place patient in room far away from exits and stairways  Taken 2/25/2025 0130 by Shari Yuan, RN  Nursing Interventions for Elopement Risk:   Escort with two staff members if patient must leave the unit   Make sure patient has all necessary personal care items     Problem: Anxiety  Goal: Will report anxiety at manageable levels  Description: INTERVENTIONS:  1. Administer medication as ordered  2. Teach and rehearse alternative coping skills  3. Provide emotional support with 1:1 interaction with staff  Outcome: Progressing     Problem: Depression/Self Harm  Goal: Effect of psychiatric condition will be minimized and patient will be protected from self harm  Description: INTERVENTIONS:  1. Assess impact of patient's symptoms on level of functioning, self care needs and offer support as indicated  2. Assess patient/family knowledge of depression, impact on illness and need for teaching  3. Provide emotional support, presence and reassurance  4. Assess for possible suicidal thoughts or ideation. If patient expresses suicidal thoughts or statements do not leave alone, initiate Suicide Precautions, move to a room close to the nursing station and obtain sitter  5. Initiate consults as appropriate with Mental Health Professional, Spiritual Care, Psychosocial CNS, and consider a recommendation to the L[facility] for a Psychiatric Consultation  Outcome: Progressing     Problem: Coping  Goal: Pt/Family able to verbalize concerns and demonstrate effective coping strategies  Description: INTERVENTIONS:  1. Assist patient/family to identify coping skills, available support systems and cultural and spiritual
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
## 2025-02-25 NOTE — PROGRESS NOTES
[facility]  Admission Note     Admission Type:   Admission Type: Involuntary    Reason for admission:  Reason for Admission: Depressive disorder      Addictive Behavior:   Addictive Behavior  In the Past 3 Months, Have You Felt or Has Someone Told You That You Have a Problem With  : None    Medical Problems:   Past Medical History:   Diagnosis Date    Back pain     Complex regional pain syndrome     Insomnia     Kidney disease     Neck pain     Post traumatic stress disorder        Status EXAM:  Mental Status and Behavioral Exam  Normal: No  Level of Assistance: Independent/Self  Facial Expression: Flat, Worried  Affect: Congruent  Level of Consciousness: Alert  Frequency of Checks: 4 times per hour, close  Mood:Normal: No  Mood: Anxious  Motor Activity:Normal: Yes  Eye Contact: Good  Observed Behavior: Cooperative, Guarded  Sexual Misconduct History: Current - no  Preception: [place] to person, [place] to time, [place] to place, [place] to situation  Attention:Normal: Yes  Thought Processes: Unremarkable  Thought Content:Normal: No  Thought Content: Preoccupations  Depression Symptoms: Feelings of helplessness  Anxiety Symptoms: Generalized  Kalie Symptoms: Poor judgment  Hallucinations: None  Delusions: No  Memory:Normal: Yes  Insight and Judgment: No  Insight and Judgment: Poor judgment, Poor insight    Tobacco Screening:  Practical Counseling, on admission, casey X, if applicable and completed (first 3 are required if patient doesn't refuse):            ( ) Recognizing danger situations (included triggers and roadblocks)                    ( ) Coping skills (new ways to manage stress,relaxation techniques, changing routine, distraction)                                                           ( ) Basic information about quitting (benefits of quitting, techniques in how to quit, available resources  ( ) [facility]

## 2025-02-25 NOTE — PLAN OF CARE
Problem: Pain  Goal: Verbalizes/displays adequate comfort level or baseline comfort level  2/25/2025 1315 by Nikki Stout LPN  Outcome: Progressing  2/25/2025 1203 by Nikki Stout LPN  Outcome: Progressing  2/25/2025 0422 by Rhea Griggs, HELEN  Outcome: Progressing     Problem: Coping  Goal: Pt/Family able to verbalize concerns and demonstrate effective coping strategies  Description: INTERVENTIONS:  1. Assist patient/family to identify coping skills, available support systems and cultural and spiritual values  2. Provide emotional support, including active listening and acknowledgement of concerns of patient and caregivers  3. Reduce environmental stimuli, as able  4. Instruct patient/family in relaxation techniques, as appropriate  5. Assess for spiritual pain/suffering and initiate Spiritual Care, Psychosocial Clinical Specialist consults as needed  2/25/2025 1203 by Nikki Stout LPN  Outcome: Progressing  2/25/2025 0422 by Rhea Griggs, RN  Outcome: Not Progressing   Pt scheduled medication and prn's reordered, methocarbamol bid given per order, states chronic pain and medication given per order. Pt resting quietly OU closed respirations easy and even.

## 2025-02-25 NOTE — PROGRESS NOTES
0320, patient arrived in the unit on wheelchair wearing hospital gown, alert & oriented accompanied by ED staff & security. Pt was checked for any contraband items & non was found. All belongings secured & labeled. Oriented to the unit & his room. VS checked & recorded, no fresh injury noted nor reported. Agreed to participated in the admission process. Snacks & drinks provided, safe environment maintained.
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
## 2025-02-25 NOTE — ED PROVIDER NOTES
[facility] EMERGENCY DEPARTMENT  EMERGENCY DEPARTMENT ENCOUNTER      Pt Name: Rl Clark  MRN: [id number]  Birthdate 1985  Date of evaluation: 2/24/2025  Provider: Anna Noriega MD    CHIEF COMPLAINT       Chief Complaint   Patient presents with    Psychiatric Evaluation     Brought in by Police with crisis center staff, stated that Pt was currently on a divorce called his wife to the room where he is pointing gun to his head, pulled the trigger but it did not fire. On arrival Pt is calm cooperative and on handcuff which was remove to assess the Pt.         HISTORY OF PRESENT ILLNESS   (Location/Symptom, Timing/Onset, Context/Setting, Quality, Duration, Modifying Factors, Severity)  Note limiting factors.   Rl Clark is a 39 y.o. male who presents to the emergency department after pointing an unloaded gun to his head and pulling the trigger.  He states that he knew the gun was unloaded because he unloaded it, and states that his goal was to \"shock\" his wife.  Patient is in the midst of a divorce.  He denies intent to kill himself.  He denies hearing voices.  He does have a history of seizures and takes Keppra.      This patient is at risk for a communicable infection. Therefore, personal protection equipment consisting of a mask and gloves worn for the exam.     Nursing Notes were reviewed.    REVIEW OF SYSTEMS    (2-9 systems for level 4, 10 or more for level 5)     As per HPI    Except as noted above the remainder of the review of systems was reviewed and negative.       PAST MEDICAL HISTORY     Past Medical History:   Diagnosis Date    Back pain     Complex regional pain syndrome     Insomnia     Kidney disease     Neck pain     Post traumatic stress disorder          SURGICAL HISTORY       Past Surgical History:   Procedure Laterality Date    ARM SURGERY      upper arm/elbow surgery    HERNIA REPAIR      SCROTAL SURGERY Left 8/16/2021    LEFT SCROTAL EXPLORATION performed by Nicolle Lopez,

## 2025-02-25 NOTE — H&P
Hospital Medicine History & Physical      PCP: System, Referring Not In (Inactive)    Date of Admission: 2/24/2025    Date of Service: Pt seen/examined on 02/25/25       Chief Complaint:    Chief Complaint   Patient presents with    Psychiatric Evaluation     Brought in by Police with crisis center staff, stated that Pt was currently on a divorce called his wife to the room where he is pointing gun to his head, pulled the trigger but it did not fire. On arrival Pt is calm cooperative and on handcuff which was remove to assess the Pt.         History Of Present Illness:      The patient is a 39 y.o. male who presented to ED for psychiatric evaluation.  Patient was seen and evaluated in the ED by the ED medical provider, patient was medically cleared for admission to [facility]. He denies chest pain, nausea, vomiting, and diarrhea. This note serves as an admission medical H&P.    Tobacco use: denies  ETOH use: denies  Illicit drug use: denies, UDS +methadone, +PCP     Patient denies any medical complaints.    Past Medical History:        Diagnosis Date    Back pain     Complex regional pain syndrome     Insomnia     Kidney disease     Neck pain     Post traumatic stress disorder        Past Surgical History:        Procedure Laterality Date    ARM SURGERY      upper arm/elbow surgery    HERNIA REPAIR      SCROTAL SURGERY Left 8/16/2021    LEFT SCROTAL EXPLORATION performed by Nicolle Lopez MD at [facility] OR       Medications Prior to Admission:    Prior to Admission medications    Medication Sig Start Date End Date Taking? Authorizing Provider   traMADol (ULTRAM) 50 MG tablet Take 1 tablet by mouth every 6 hours as needed for Pain. Max Daily Amount: 200 mg   Yes ProviderDavid MD   oxyCODONE-acetaminophen (PERCOCET) 5-325 MG per tablet Take 1 tablet by mouth every 4 hours as needed for Pain.   Yes ProviderDavid MD   methocarbamol (ROBAXIN) 500 MG tablet Take 1 tablet by mouth 2 times daily   Yes Provider  focal neurologic deficit on exam.  Cranial nerves II through XII intact.  Patient is able to ambulate without difficulty.  Psych: Per psychiatry team evaluation     CBC:   Recent Labs     02/24/25  2224   WBC 7.7   HGB 16.4   HCT 47.8   MCV 89.5        BMP:   Recent Labs     02/24/25  2224      K 4.0      CO2 26   BUN 14   CREATININE 1.6*     LIVER PROFILE: No results for input(s): \"AST\", \"ALT\", \"LIPASE\", \"AMYLASE\", \"BILIDIR\", \"BILITOT\", \"ALKPHOS\" in the last 72 hours.    Invalid input(s): \"ALB\"  PT/INR: No results for input(s): \"PROTIME\", \"INR\" in the last 72 hours.  APTT: No results for input(s): \"APTT\" in the last 72 hours.  UA:  Recent Labs     02/24/25  2333 02/24/25  2334   COLORU Yellow  --    PHUR 6.0 6.0   CLARITYU Clear  --    LEUKOCYTESUR Negative  --    UROBILINOGEN 0.2  --    BILIRUBINUR Negative  --    BLOODU Negative  --    GLUCOSEU Negative  --        U/A:    Lab Results   Component Value Date/Time    COLORU Yellow 02/24/2025 11:33 PM    WBCUA 1 04/02/2017 11:05 PM    RBCUA 1 04/02/2017 11:05 PM    CLARITYU Clear 02/24/2025 11:33 PM    LEUKOCYTESUR Negative 02/24/2025 11:33 PM    BLOODU Negative 02/24/2025 11:33 PM    GLUCOSEU Negative 02/24/2025 11:33 PM       CULTURES  Results       Procedure Component Value Units Date/Time    COVID-19 & Influenza Combo [[id number]] Collected: 02/25/25 0119    Order Status: Completed Specimen: Nasopharyngeal Swab Updated: 02/25/25 0244     SARS-CoV-2 RNA, RT PCR NOT DETECTED     Comment: Not Detected results do not preclude SARS-CoV-2 infection and  should not be used as the sole basis for patient management  decisions.  Results must be combined with clinical observations,  patient history, and epidemiological information.  Testing was performed using ERICA DAVID SARS-CoV-2, Influenza A/B  and Respiratory Syncytial Virus nucleic acid assay. This  test is a multiplex Real-Time Reverse Transcriptase Polymerase Chain  Reaction (RT-PCR)-based in vitro

## 2025-02-25 NOTE — H&P
metoprolol succinate  50 mg Oral Nightly    NIFEdipine  30 mg Oral Daily       PAST MEDICAL HISTORY:    Past Medical History:   Diagnosis Date    Back pain     Complex regional pain syndrome     Insomnia     Kidney disease     Neck pain     Post traumatic stress disorder         PAST SURGICAL HISTORY:    Past Surgical History:   Procedure Laterality Date    ARM SURGERY      upper arm/elbow surgery    HERNIA REPAIR      SCROTAL SURGERY Left 8/16/2021    LEFT SCROTAL EXPLORATION performed by Nicolle Lopez MD at [facility] OR       PROBLEM LIST:  Principal Problem:    Depressive disorder  Active Problems:    Suicide attempt by inadequate means ([facility])    Seizure disorder ([facility])    XENA (acute kidney injury)    Chronic kidney disease    Primary hypertension  Resolved Problems:    * No resolved hospital problems. *       SOCIAL HISTORY:  Social History     Socioeconomic History    Marital status:      Spouse name: Not on file    Number of children: 4    Years of education: Not on file    Highest education level: Not on file   Occupational History    Not on file   Tobacco Use    Smoking status: Never    Smokeless tobacco: Never    Tobacco comments:     Cigar per year   Vaping Use    Vaping status: Never Used   Substance and Sexual Activity    Alcohol use: No    Drug use: No    Sexual activity: Yes     Partners: Female   Other Topics Concern    Not on file   Social History Narrative    Not on file     Social Determinants of Health     Financial Resource Strain: Not on file   Food Insecurity: No Food Insecurity (2/25/2025)    Hunger Vital Sign     Worried About Running Out of Food in the Last Year: Never true     Ran Out of Food in the Last Year: Never true   Transportation Needs: No Transportation Needs (2/25/2025)    PRAPARE - Transportation     Lack of Transportation (Medical): No     Lack of Transportation (Non-Medical): No   Physical Activity: Not on file   Stress: Not on file   Social Connections: Not on file  nicotine polacrilex, hydrOXYzine HCl, OLANZapine **OR** OLANZapine (ZyPREXA) 10 mg in sterile water 2 mL injection, benztropine mesylate, traZODone, acetaminophen, aluminum & magnesium hydroxide-simethicone, oxyCODONE-acetaminophen, traMADol, melatonin   Estimated length of stay: 2-3 days  Prognosis:  Fair   Criteria for Discharge:  Not suicidal, sleeping well, affect stable, depression improving, eating well, aftercare arranged.     Spent > 70 minutes evaluating and treating patient, more than 50 % of that time was spent counseling the patient on their symptoms, treatment, and expected goals.        ______  PLAN   1.  Admit to Adult Behavioral Unit / Senior Behavioral Unit  2.  Consult Internal Medicine to evaluate and treat medical conditions  3.  Adjust psychotropic medications to target symptoms  4.  Occupational Therapy, Physical Therapy, Group Psychotherapy as tolerated   5.  Reviewed treatment plan with patient including medication risks, benefits, side effects.  Obtained informed consent for treatment.     ANDREW Holder-BC

## 2025-02-25 NOTE — ED NOTES
Patient presented to ED via Mobile Crisis WITH statement of belief signed by mobile crisis staff for evaluation. Explained process of securing patient belongings for patient/staff safety, patient verbalized understanding. Security at bedside, metal detector wanding completed. Patient changed into safety gown. All belongings itemized by  Enedelia, placed in labeled bag, removed from the patient care area, and taken to the security locker. Itemized list placed with belongings, in patient record, and a copy given to the patient.

## 2025-02-25 NOTE — PLAN OF CARE
Problem: Risk for Elopement  Goal: Patient will not exit the unit/facility without proper excort  2/25/2025 1203 by Nikki Stout LPN  Outcome: Progressing  2/25/2025 0422 by Rhea Griggs, HELEN  Outcome: Progressing  Flowsheets  Taken 2/25/2025 0400 by Rhea Griggs, RN  Nursing Interventions for Elopement Risk:   Shoes and clothing collected and placed in gown attire   Reduce environmental triggers   Place patient in room far away from exits and stairways  Taken 2/25/2025 0130 by Shari Yuan, RN  Nursing Interventions for Elopement Risk:   Escort with two staff members if patient must leave the unit   Make sure patient has all necessary personal care items     Problem: Self Harm/Suicidality  Goal: Will have no self-injury during hospital stay  Description: INTERVENTIONS:  1.  Ensure constant observer at bedside with Q15M safety checks  2.  Maintain a safe environment  3.  Secure patient belongings  4.  Ensure family/visitors adhere to safety recommendations  5.  Ensure safety tray has been added to patient's diet order  6.  Every shift and PRN: Re-assess suicidal risk via Frequent Screener    2/25/2025 0422 by Rhea Griggs, HELEN  Outcome: Completed     Problem: Anxiety  Goal: Will report anxiety at manageable levels  Description: INTERVENTIONS:  1. Administer medication as ordered  2. Teach and rehearse alternative coping skills  3. Provide emotional support with 1:1 interaction with staff  2/25/2025 1203 by Nikki Stout LPN  Outcome: Progressing     Problem: Coping  Goal: Pt/Family able to verbalize concerns and demonstrate effective coping strategies  Description: INTERVENTIONS:  1. Assist patient/family to identify coping skills, available support systems and cultural and spiritual values  2. Provide emotional support, including active listening and acknowledgement of concerns of patient and caregivers  3. Reduce environmental stimuli, as able  4.  Progressing  2/25/2025 0422 by Rhea Griggs, RN  Outcome: Not Progressing   Pt in bed most of the day getting up for meals only. Pt denies SI,HI,AVH, pt withdrawn answers questions slowly and difficult to engage in conversation, minimizes his actions showing no emotion about any stress he caused his family. Pt states he will talk to staff if he has feelings of self harm.

## 2025-02-25 NOTE — BH NOTE
Pt comes out to nurses station asking to speak to Maryanne, when pt told she had already gone for the day pt becomes agitated and yelling in milieu that he had to leave or he would flunk out of school. Tried to explain the severity of his actions but pt states I wasn't going to hurt myself I unloaded the gun first. Pt continues to   Minimize his actions, and refuses to  talk any longer.

## 2025-02-25 NOTE — PROGRESS NOTES
4 Eyes Skin Assessment     NAME:  Rl Clark  YOB: 1985  MEDICAL RECORD NUMBER:  [id number]    The patient is being assessed for  Admission    I agree that at least one RN has performed a thorough Head to Toe Skin Assessment on the patient. ALL assessment sites listed below have been assessed.      Areas assessed by both nurses:    Head, Face, Ears, Shoulders, Back, Chest, Arms, Elbows, Hands, and Legs. Feet and Heels        Does the Patient have a Wound? No noted wound(s)     No fresh injury noted nor reported.   Perry Prevention initiated by RN: No  Wound Care Orders initiated by RN: No    Pressure Injury (Stage 3,4, Unstageable, DTI, NWPT, and Complex wounds) if present, place Wound referral order by RN under : No    New Ostomies, if present place, Ostomy referral order under : No     Nurse 1 eSignature: Electronically signed by Rhea Griggs RN on 2/25/25 at 4:33 AM EST    **SHARE this note so that the co-signing nurse can place an eSignature**    Nurse 2 eSignature: Electronically signed by Jackie Morrell RN on 2/25/25 at 4:46 AM EST

## 2025-02-26 LAB
ANION GAP SERPL CALCULATED.3IONS-SCNC: 9 MMOL/L (ref 3–16)
BUN SERPL-MCNC: 11 MG/DL (ref 7–20)
CALCIUM SERPL-MCNC: 8.4 MG/DL (ref 8.3–10.6)
CHLORIDE SERPL-SCNC: 103 MMOL/L (ref 99–110)
CHOLEST SERPL-MCNC: 155 MG/DL (ref 0–199)
CO2 SERPL-SCNC: 28 MMOL/L (ref 21–32)
CREAT SERPL-MCNC: 1.5 MG/DL (ref 0.9–1.3)
EST. AVERAGE GLUCOSE BLD GHB EST-MCNC: 108.3 MG/DL
GFR SERPLBLD CREATININE-BSD FMLA CKD-EPI: 59 ML/MIN/{1.73_M2}
GLUCOSE SERPL-MCNC: 89 MG/DL (ref 70–99)
HBA1C MFR BLD: 5.4 %
HDLC SERPL-MCNC: 25 MG/DL (ref 40–60)
LDLC SERPL CALC-MCNC: 109 MG/DL
POTASSIUM SERPL-SCNC: 4.3 MMOL/L (ref 3.5–5.1)
SODIUM SERPL-SCNC: 140 MMOL/L (ref 136–145)
TRIGL SERPL-MCNC: 107 MG/DL (ref 0–150)
VLDLC SERPL CALC-MCNC: 21 MG/DL

## 2025-02-26 PROCEDURE — 83036 HEMOGLOBIN GLYCOSYLATED A1C: CPT

## 2025-02-26 PROCEDURE — 36415 COLL VENOUS BLD VENIPUNCTURE: CPT

## 2025-02-26 PROCEDURE — 80048 BASIC METABOLIC PNL TOTAL CA: CPT

## 2025-02-26 PROCEDURE — G0378 HOSPITAL OBSERVATION PER HR: HCPCS

## 2025-02-26 PROCEDURE — 6370000000 HC RX 637 (ALT 250 FOR IP)

## 2025-02-26 PROCEDURE — 80061 LIPID PANEL: CPT

## 2025-02-26 RX ADMIN — TRAMADOL HYDROCHLORIDE 50 MG: 50 TABLET ORAL at 14:51

## 2025-02-26 RX ADMIN — NIFEDIPINE 30 MG: 30 TABLET, FILM COATED, EXTENDED RELEASE ORAL at 08:16

## 2025-02-26 RX ADMIN — TRAMADOL HYDROCHLORIDE 50 MG: 50 TABLET ORAL at 20:50

## 2025-02-26 RX ADMIN — LEVETIRACETAM 500 MG: 500 TABLET, FILM COATED ORAL at 20:45

## 2025-02-26 RX ADMIN — OXYCODONE HYDROCHLORIDE AND ACETAMINOPHEN 1 TABLET: 5; 325 TABLET ORAL at 20:50

## 2025-02-26 RX ADMIN — OXYCODONE HYDROCHLORIDE AND ACETAMINOPHEN 1 TABLET: 5; 325 TABLET ORAL at 08:15

## 2025-02-26 RX ADMIN — OXYCODONE HYDROCHLORIDE AND ACETAMINOPHEN 1 TABLET: 5; 325 TABLET ORAL at 14:51

## 2025-02-26 RX ADMIN — LEVETIRACETAM 500 MG: 500 TABLET, FILM COATED ORAL at 08:15

## 2025-02-26 RX ADMIN — METHOCARBAMOL TABLETS 500 MG: 500 TABLET, COATED ORAL at 20:43

## 2025-02-26 RX ADMIN — GABAPENTIN 600 MG: 300 CAPSULE ORAL at 14:50

## 2025-02-26 RX ADMIN — GABAPENTIN 600 MG: 300 CAPSULE ORAL at 20:45

## 2025-02-26 RX ADMIN — TRAMADOL HYDROCHLORIDE 50 MG: 50 TABLET ORAL at 08:15

## 2025-02-26 RX ADMIN — METOPROLOL SUCCINATE 50 MG: 50 TABLET, EXTENDED RELEASE ORAL at 20:45

## 2025-02-26 RX ADMIN — FINASTERIDE 5 MG: 5 TABLET, FILM COATED ORAL at 08:16

## 2025-02-26 RX ADMIN — METHOCARBAMOL TABLETS 500 MG: 500 TABLET, COATED ORAL at 08:15

## 2025-02-26 RX ADMIN — GABAPENTIN 600 MG: 300 CAPSULE ORAL at 08:15

## 2025-02-26 ASSESSMENT — PAIN DESCRIPTION - ORIENTATION
ORIENTATION: RIGHT

## 2025-02-26 ASSESSMENT — PAIN DESCRIPTION - LOCATION
LOCATION: SHOULDER

## 2025-02-26 ASSESSMENT — PAIN - FUNCTIONAL ASSESSMENT
PAIN_FUNCTIONAL_ASSESSMENT: ACTIVITIES ARE NOT PREVENTED

## 2025-02-26 ASSESSMENT — PAIN SCALES - GENERAL
PAINLEVEL_OUTOF10: 2
PAINLEVEL_OUTOF10: 6
PAINLEVEL_OUTOF10: 8
PAINLEVEL_OUTOF10: 0
PAINLEVEL_OUTOF10: 6
PAINLEVEL_OUTOF10: 2
PAINLEVEL_OUTOF10: 1
PAINLEVEL_OUTOF10: 6

## 2025-02-26 ASSESSMENT — PAIN DESCRIPTION - PAIN TYPE: TYPE: CHRONIC PAIN

## 2025-02-26 ASSESSMENT — PAIN DESCRIPTION - DESCRIPTORS
DESCRIPTORS: ACHING;THROBBING;STABBING
DESCRIPTORS: ACHING

## 2025-02-26 ASSESSMENT — PAIN SCALES - WONG BAKER
WONGBAKER_NUMERICALRESPONSE: HURTS A LITTLE BIT
WONGBAKER_NUMERICALRESPONSE: HURTS A LITTLE BIT
WONGBAKER_NUMERICALRESPONSE: NO HURT
WONGBAKER_NUMERICALRESPONSE: HURTS A LITTLE BIT

## 2025-02-26 ASSESSMENT — PAIN DESCRIPTION - FREQUENCY: FREQUENCY: CONTINUOUS

## 2025-02-26 ASSESSMENT — PAIN DESCRIPTION - ONSET: ONSET: ON-GOING

## 2025-02-26 NOTE — BH NOTE
CPS  is Siomara Duff as the case was opened. Clinician called leaving her direct line for  to call back. Clinician left the information on the message that the patient has been calling his wife today threatening to kill her if she talks to staff, police or CPS about the children being home at the time of the incident. She was also informed patient's phone calls are restricted and monitored at this point and if he continues threats his phone privileges will be revoked.

## 2025-02-26 NOTE — PROGRESS NOTES
2205, still complaining of pain on his neck area, pain score at 7/10, Percocet given as PRN. To continue to monitor at this time.

## 2025-02-26 NOTE — PROGRESS NOTES
Department of Psychiatry  AttendingProgress Note  Chief Complaint: Mood/personality disorder    Patient's chart was reviewed and collaborated with  about the treatment plan.    SUBJECTIVE:    Pt agreeable to talk today.  Pt remains with blunted affect, little emotion while discussing events that took place prior to admission.  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  Pt reminded that he is on a legal hold.  Pt reports talking to wife on the phone, states conversations are not \"great\", but he states they are no longer getting , he feels his actions worked to prove that his wife still loves him and now is going to stay with him.  Pt feels that he did nothing wrong by putting an unloaded weapon to his head to \"scare\" his wife.  He continues to deny that the children were present at the time of him holding a weapon to his head. He states they were with his parents.  Pt did give me verbal permission to call and verify this information with his parents, gave me a number, then a name of Keira.  I verified this was his parent's name, he shook his head no, stating it was his wife.  I verified if he wanted me to call his wife, and he responded yes.  Pt remained flat/blunted throughout the conversation.  No insight into his actions, feels being on the unit is unwarranted.  Pt minimizing his actions, not forthcoming with information.      I was able to contact Keira, pt's wife, after pt gave me verbal permission to call her.  Keira verified that all four children were in the home at the time that pt held th gun to his head.  Pt states she was eating in the kitchen with the children when the pt called her into the office, showed her the gun and held it to his head, pulling the trigger.  Pt was able to get the gun from him, taking several other guns from the home and walking them to the car.  She states the children saw her carrying the guns,  crying, but did not see their father holding the gun to his head.  She convinced him to talk with mobile crisis, and he mentioned using a knife if he did not have access to a gun, which is when mobile crisis alerted police to the home.  The wife states that pt has been calling her today, threatening that she needs to lie to hospital staff, police, and CPS about the kids being in the home or he would kill her.  When she warned him the calls may be recorded, he walked back on these statements, but she remains very scared that he may lie to staff and be released without the help he needs.  Keira would like his phone calls to be restricted, stating that he did threaten her several times and this made her very upset.  Nursing communication now in place that pt must make calls from nursing desk with staff present and calls may be restricted further if threats continue.  SW did make a call to CPS this morning after speaking with Keira given that the children were present.      Patient is feeling unchanged. Suicidal ideation:  denies suicidal ideation.  Patient does not have medication side effects.    ROS: Patient has new complaints: no  Sleeping adequately:  Yes   Appetite adequate: Yes  Attending groups: No:   Visitors:No    OBJECTIVE    Physical  VITALS:  /67   Pulse 77   Temp 98.3 °F (36.8 °C) (Oral)   Resp 18   Ht 1.829 m (6')   Wt 81.6 kg (180 lb)   SpO2 98%   BMI 24.41 kg/m²     Mental Status Examination:  Patients appearance was well-appearing and hospital attire. Thoughts are Logical. Homicidal ideations  - threats made to wife on phone 2/26.  No abnormal movements, tics or mannerisms.  Memory intact Aims 0. Concentration Fair.   Alert and oriented X 4. Insight and Judgement impaired insight. Patient was cooperative. Patient gait normal. Mood constricted, affect blunted Hallucinations Absent, suicidal ideations no specific plan to harm self Speech normal pitch and normal volume    Data  Labs:

## 2025-02-26 NOTE — PLAN OF CARE
Problem: Pain  Goal: Verbalizes/displays adequate comfort level or baseline comfort level  2/26/2025 1827 by Yaz Daley, RN  Outcome: Progressing

## 2025-02-26 NOTE — PROGRESS NOTES
2104, complaining of neck pain, pain score at 6/10, Ultram 50mg given as PRN for pain. To continue to monitor at this time

## 2025-02-26 NOTE — FLOWSHEET NOTE
Patient complaining of right shoulder pain.  He rates his pain a 7 on a 1-10 scale.  Patient states this pain is chronic.  He requested PRN medications of Tramadol and Percocet.  Patient states he always takes these at the same time.  Tramadol  50 mg and Percocet given per order for pan.  See MAR   02/26/25 0156   Pain Assessment   Pain Assessment None - Denies Pain   Pain Level 6   Patient's Stated Pain Goal 3   Pain Location Shoulder   Pain Orientation Right   Pain Descriptors Aching   Functional Pain Assessment Activities are not prevented   Pain Type Chronic pain   Pain Frequency Continuous   Pain Onset On-going

## 2025-02-26 NOTE — PLAN OF CARE
Problem: Risk for Elopement  Goal: Patient will not exit the unit/facility without proper excort  Outcome: Progressing     Problem: Anxiety  Goal: Will report anxiety at manageable levels  Description: INTERVENTIONS:  1. Administer medication as ordered  2. Teach and rehearse alternative coping skills  3. Provide emotional support with 1:1 interaction with staff  Outcome: Progressing     Problem: Coping  Goal: Pt/Family able to verbalize concerns and demonstrate effective coping strategies  Description: INTERVENTIONS:  1. Assist patient/family to identify coping skills, available support systems and cultural and spiritual values  2. Provide emotional support, including active listening and acknowledgement of concerns of patient and caregivers  3. Reduce environmental stimuli, as able  4. Instruct patient/family in relaxation techniques, as appropriate  5. Assess for spiritual pain/suffering and initiate Spiritual Care, Psychosocial Clinical Specialist consults as needed  Outcome: Progressing     Problem: Depression/Self Harm  Goal: Effect of psychiatric condition will be minimized and patient will be protected from self harm  Description: INTERVENTIONS:  1. Assess impact of patient's symptoms on level of functioning, self care needs and offer support as indicated  2. Assess patient/family knowledge of depression, impact on illness and need for teaching  3. Provide emotional support, presence and reassurance  4. Assess for possible suicidal thoughts or ideation. If patient expresses suicidal thoughts or statements do not leave alone, initiate Suicide Precautions, move to a room close to the nursing station and obtain sitter  5. Initiate consults as appropriate with Mental Health Professional, Spiritual Care, Psychosocial CNS, and consider a recommendation to the LIP for a Psychiatric Consultation  Outcome: Progressing     Problem: Pain  Goal: Verbalizes/displays adequate comfort level or baseline comfort  level  Outcome: Progressing   Pt visible on unit watching tv but not social with peers or staff.

## 2025-02-26 NOTE — PLAN OF CARE
Problem: Risk for Elopement  Goal: Patient will not exit the unit/facility without proper excort  2/25/2025 2145 by Rhea Griggs, HELEN  Outcome: Progressing     Problem: Anxiety  Goal: Will report anxiety at manageable levels  Description: INTERVENTIONS:  1. Administer medication as ordered  2. Teach and rehearse alternative coping skills  3. Provide emotional support with 1:1 interaction with staff  2/25/2025 2145 by Rhea Griggs, HELEN  Outcome: Progressing     Problem: Depression/Self Harm  Goal: Effect of psychiatric condition will be minimized and patient will be protected from self harm  Description: INTERVENTIONS:  1. Assess impact of patient's symptoms on level of functioning, self care needs and offer support as indicated  2. Assess patient/family knowledge of depression, impact on illness and need for teaching  3. Provide emotional support, presence and reassurance  4. Assess for possible suicidal thoughts or ideation. If patient expresses suicidal thoughts or statements do not leave alone, initiate Suicide Precautions, move to a room close to the nursing station and obtain sitter  5. Initiate consults as appropriate with Mental Health Professional, Spiritual Care, Psychosocial CNS, and consider a recommendation to the LIP for a Psychiatric Consultation  2/25/2025 2145 by Rhea Griggs RN  Outcome: Progressing     Problem: Coping  Goal: Pt/Family able to verbalize concerns and demonstrate effective coping strategies  Description: INTERVENTIONS:  1. Assist patient/family to identify coping skills, available support systems and cultural and spiritual values  2. Provide emotional support, including active listening and acknowledgement of concerns of patient and caregivers  3. Reduce environmental stimuli, as able  4. Instruct patient/family in relaxation techniques, as appropriate  5. Assess for spiritual pain/suffering and initiate Spiritual Care,  Psychosocial Clinical Specialist consults as needed  2/25/2025 2145 by Rhea Griggs, RN  Outcome: Not Progressing  2/25/2025 1203 by Nikki Stout LPN  Outcome: Progressing     Problem: Pain  Goal: Verbalizes/displays adequate comfort level or baseline comfort level  2/25/2025 2145 by Rhea Griggs, RN  Outcome: Not Progressing  2/25/2025 1315 by Nikki Stout LPN  Outcome: Progressing  2/25/2025 1203 by Nikki Stout LPN  Outcome: Progressing       Received pt for continuity of care, physically well, withdrawn to his room, cooperative with care but remains flat & guarded. During 1-1, denies any SI/HI/AVH, HS meds given & asked for his pain meds. Requested to take ultram & percocet together, encouraged to give some gap between meds, pt agreed but appears irritable about it. Also complaining about his Ultram dose, states that he is usually takes it as 100mg. Safe environment provided & maintained.
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
## 2025-02-27 LAB
ANION GAP SERPL CALCULATED.3IONS-SCNC: 8 MMOL/L (ref 3–16)
BUN SERPL-MCNC: 13 MG/DL (ref 7–20)
CALCIUM SERPL-MCNC: 8.3 MG/DL (ref 8.3–10.6)
CHLORIDE SERPL-SCNC: 103 MMOL/L (ref 99–110)
CO2 SERPL-SCNC: 26 MMOL/L (ref 21–32)
CREAT SERPL-MCNC: 1.4 MG/DL (ref 0.9–1.3)
GFR SERPLBLD CREATININE-BSD FMLA CKD-EPI: 64 ML/MIN/{1.73_M2}
GLUCOSE SERPL-MCNC: 92 MG/DL (ref 70–99)
POTASSIUM SERPL-SCNC: 3.9 MMOL/L (ref 3.5–5.1)
SODIUM SERPL-SCNC: 137 MMOL/L (ref 136–145)

## 2025-02-27 PROCEDURE — 6370000000 HC RX 637 (ALT 250 FOR IP): Performed by: PSYCHIATRY & NEUROLOGY

## 2025-02-27 PROCEDURE — 80048 BASIC METABOLIC PNL TOTAL CA: CPT

## 2025-02-27 PROCEDURE — 36415 COLL VENOUS BLD VENIPUNCTURE: CPT

## 2025-02-27 PROCEDURE — 6370000000 HC RX 637 (ALT 250 FOR IP)

## 2025-02-27 PROCEDURE — G0378 HOSPITAL OBSERVATION PER HR: HCPCS

## 2025-02-27 RX ORDER — TRAMADOL HYDROCHLORIDE 50 MG/1
100 TABLET ORAL EVERY 6 HOURS PRN
Status: DISCONTINUED | OUTPATIENT
Start: 2025-02-27 | End: 2025-02-28 | Stop reason: HOSPADM

## 2025-02-27 RX ADMIN — HYDROXYZINE HYDROCHLORIDE 50 MG: 50 TABLET ORAL at 08:58

## 2025-02-27 RX ADMIN — METHOCARBAMOL TABLETS 500 MG: 500 TABLET, COATED ORAL at 21:50

## 2025-02-27 RX ADMIN — FINASTERIDE 5 MG: 5 TABLET, FILM COATED ORAL at 08:17

## 2025-02-27 RX ADMIN — OXYCODONE HYDROCHLORIDE AND ACETAMINOPHEN 1 TABLET: 5; 325 TABLET ORAL at 21:50

## 2025-02-27 RX ADMIN — NIFEDIPINE 30 MG: 30 TABLET, FILM COATED, EXTENDED RELEASE ORAL at 08:12

## 2025-02-27 RX ADMIN — METOPROLOL SUCCINATE 50 MG: 50 TABLET, EXTENDED RELEASE ORAL at 21:51

## 2025-02-27 RX ADMIN — LEVETIRACETAM 500 MG: 500 TABLET, FILM COATED ORAL at 08:12

## 2025-02-27 RX ADMIN — TRAMADOL HYDROCHLORIDE 50 MG: 50 TABLET ORAL at 13:18

## 2025-02-27 RX ADMIN — GABAPENTIN 600 MG: 300 CAPSULE ORAL at 08:13

## 2025-02-27 RX ADMIN — OXYCODONE HYDROCHLORIDE AND ACETAMINOPHEN 1 TABLET: 5; 325 TABLET ORAL at 08:12

## 2025-02-27 RX ADMIN — GABAPENTIN 600 MG: 300 CAPSULE ORAL at 21:50

## 2025-02-27 RX ADMIN — OLANZAPINE 10 MG: 10 TABLET, FILM COATED ORAL at 08:58

## 2025-02-27 RX ADMIN — METHOCARBAMOL TABLETS 500 MG: 500 TABLET, COATED ORAL at 08:12

## 2025-02-27 RX ADMIN — GABAPENTIN 600 MG: 300 CAPSULE ORAL at 13:25

## 2025-02-27 RX ADMIN — LEVETIRACETAM 500 MG: 500 TABLET, FILM COATED ORAL at 21:51

## 2025-02-27 RX ADMIN — TRAMADOL HYDROCHLORIDE 100 MG: 50 TABLET ORAL at 21:50

## 2025-02-27 ASSESSMENT — PAIN SCALES - WONG BAKER
WONGBAKER_NUMERICALRESPONSE: NO HURT
WONGBAKER_NUMERICALRESPONSE: HURTS EVEN MORE

## 2025-02-27 ASSESSMENT — PAIN SCALES - GENERAL
PAINLEVEL_OUTOF10: 7
PAINLEVEL_OUTOF10: 7
PAINLEVEL_OUTOF10: 0
PAINLEVEL_OUTOF10: 6
PAINLEVEL_OUTOF10: 7
PAINLEVEL_OUTOF10: 7
PAINLEVEL_OUTOF10: 0
PAINLEVEL_OUTOF10: 6
PAINLEVEL_OUTOF10: 7
PAINLEVEL_OUTOF10: 7

## 2025-02-27 ASSESSMENT — PAIN DESCRIPTION - DESCRIPTORS
DESCRIPTORS: ACHING;NAGGING;OTHER (COMMENT)
DESCRIPTORS: ACHING;DISCOMFORT
DESCRIPTORS: OTHER (COMMENT)

## 2025-02-27 ASSESSMENT — PAIN DESCRIPTION - ORIENTATION
ORIENTATION: RIGHT
ORIENTATION: RIGHT

## 2025-02-27 ASSESSMENT — PAIN - FUNCTIONAL ASSESSMENT
PAIN_FUNCTIONAL_ASSESSMENT: ACTIVITIES ARE NOT PREVENTED

## 2025-02-27 ASSESSMENT — PAIN DESCRIPTION - PAIN TYPE: TYPE: CHRONIC PAIN

## 2025-02-27 ASSESSMENT — PAIN DESCRIPTION - ONSET: ONSET: ON-GOING

## 2025-02-27 ASSESSMENT — PAIN DESCRIPTION - LOCATION
LOCATION: SHOULDER

## 2025-02-27 ASSESSMENT — PAIN DESCRIPTION - FREQUENCY: FREQUENCY: CONTINUOUS
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
## 2025-02-27 NOTE — BH NOTE
Patient alert and oriented x 3. Patient seclusive to his bed and room this evening. Patient did come to the team station to call his wife. Patient denies SI/HI/A/V/H. Patient took HS medications. Patient denies self harm. No c/o's voiced @ present.

## 2025-02-27 NOTE — PROGRESS NOTES
Patient has been visible on the unit intermittently. He is able to verbalize his needs.     Patient asked if he could use a computer in the library to attend an online class. Writer explained to the writer that there was not enough staff today to allow observation for him to go online. He states that the class is 4 hours long. He expressed concern that he will flunk the class if unable to attend. 30 minutes late, the patient came up to the team station and asked if he could use the computer, staff explained that there was no staff to oversee this today. Patient reportedly hit his hands on the counter and stared at staff in a menacing way . Security was called to the floor as a precaution if patient escalated. PRN Atarax 50 mg PO and PRN Zyprexa 10 mg PO administered at 0858. Medication effective. Medication compliant.    Patient denies any suicidal or homicidal ideation. Denies hallucinations.       Vitals:    02/27/25 0821   BP: 109/78   Pulse: 90   Resp: 12   Temp: 97.7 °F (36.5 °C)   SpO2: 100%

## 2025-02-27 NOTE — BH NOTE
Patient resting quietly in bed with eyes closed. PRN Percocet and Tramadol was effective for pain.

## 2025-02-27 NOTE — BH NOTE
Patient's wife Keira [phone] called to report that she did file the TPO for the house, the children and herself. She stated the patient will be served while he is on the unit tomorrow and was concerned about the time he will discharge. She stated CPS is coming to her home at 4pm tomorrow. She stated she will call this clinician's direct line and leave the address to the patient's mother's house where he will be discharged to.

## 2025-02-27 NOTE — PLAN OF CARE
Problem: Risk for Elopement  Goal: Patient will not exit the unit/facility without proper excort  2/26/2025 2022 by Niecy Go RN  Outcome: Progressing  2/26/2025 1545 by Nikki Stout LPN  Outcome: Progressing     Problem: Anxiety  Goal: Will report anxiety at manageable levels  Description: INTERVENTIONS:  1. Administer medication as ordered  2. Teach and rehearse alternative coping skills  3. Provide emotional support with 1:1 interaction with staff  2/26/2025 2022 by Niecy Go RN  Outcome: Progressing  2/26/2025 1545 by Nikki Stout LPN  Outcome: Progressing     Problem: Coping  Goal: Pt/Family able to verbalize concerns and demonstrate effective coping strategies  Description: INTERVENTIONS:  1. Assist patient/family to identify coping skills, available support systems and cultural and spiritual values  2. Provide emotional support, including active listening and acknowledgement of concerns of patient and caregivers  3. Reduce environmental stimuli, as able  4. Instruct patient/family in relaxation techniques, as appropriate  5. Assess for spiritual pain/suffering and initiate Spiritual Care, Psychosocial Clinical Specialist consults as needed  2/26/2025 2022 by Niecy Go RN  Outcome: Progressing  2/26/2025 1545 by Nikki Stout LPN  Outcome: Progressing     Problem: Depression/Self Harm  Goal: Effect of psychiatric condition will be minimized and patient will be protected from self harm  Description: INTERVENTIONS:  1. Assess impact of patient's symptoms on level of functioning, self care needs and offer support as indicated  2. Assess patient/family knowledge of depression, impact on illness and need for teaching  3. Provide emotional support, presence and reassurance  4. Assess for possible suicidal thoughts or ideation. If patient expresses suicidal thoughts or statements do not leave alone, initiate Suicide Precautions, move to a room close to the nursing station  and obtain sitter  5. Initiate consults as appropriate with Mental Health Professional, Spiritual Care, Psychosocial CNS, and consider a recommendation to the LIP for a Psychiatric Consultation  2/26/2025 2022 by Niecy Go, RN  Outcome: Progressing  Flowsheets (Taken 2/26/2025 2022)  Effect of psychiatric condition will be minimized and patient will be protected from self harm: Provide emotional support, presence and reassurance  2/26/2025 1545 by Nikki Stout LPN  Outcome: Progressing     Problem: Pain  Goal: Verbalizes/displays adequate comfort level or baseline comfort level  2/26/2025 2022 by Niecy Go, RN  Outcome: Progressing  2/26/2025 1827 by Yaz Daley RN  Outcome: Progressing  2/26/2025 1545 by Nikki Stout LPN  Outcome: Progressing

## 2025-02-27 NOTE — PROGRESS NOTES
Final    Anion Gap 02/24/2025 12  3 - 16 Final    Glucose 02/24/2025 73  70 - 99 mg/dL Final    BUN 02/24/2025 14  7 - 20 mg/dL Final    Creatinine 02/24/2025 1.6 (H)  0.9 - 1.3 mg/dL Final    Est, Glom Filt Rate 02/24/2025 55 (A)  >60 Final    Comment: Pediatric calculator link  https://www.kidney.org/professionals/kdoqi/gfr_calculatorped  Effective Oct 3, 2022  These results are not intended for use in patients  <18 years of age.  eGFR results are calculated without  a race factor using the 2021 CKD-EPI equation.  Careful  clinical correlation is recommended, particularly when  comparing to results calculated using previous equations.  The CKD-EPI equation is less accurate in patients with  extremes of muscle mass, extra-renal metabolism of  creatinine, excessive creatinine ingestion, or following  therapy that affects renal tubular secretion.      Calcium 02/24/2025 8.3  8.3 - 10.6 mg/dL Final    Ethanol Lvl 02/24/2025 None Detected  mg/dL Final    Comment:    None Detected  Conversion factor:  100 mg/dl = .100 g/dl  For Medical Purposes Only      Amphetamine Screen, Ur 02/24/2025 Neg  Negative <1000ng/mL Final    Barbiturate Screen, Ur 02/24/2025 Neg  Negative <200 ng/mL Final    Benzodiazepine Screen, Urine 02/24/2025 Neg  Negative <200 ng/mL Final    Cannabinoid Scrn, Ur 02/24/2025 Neg  Negative <50 ng/mL Final    Cocaine Metabolite Screen, Urine 02/24/2025 Neg  Negative <300 ng/mL Final    Opiate Screen, Urine 02/24/2025 Neg  Negative <300 ng/mL Final    Comment: \"Therapeutic levels of pain medication, especially oxycontin and synthetic  opioids, may not be detected by this Methodology. Pain management screen  panel  Drug panel-PM-Hi Res Ur, Interp (PAIN) should be considered for drug  monitoring \".      Phencyclidine (PCP), Screen, Urine 02/24/2025 POSITIVE (A)  Negative <25 ng/mL Final    Comment: High concentrations of dextromethorpan may cause false  positive results for PCP.  Therefore, confirmatory
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
## 2025-02-27 NOTE — GROUP NOTE
Group Therapy Note    Date: 2/27/2025    Group Start Time: 1000  Group End Time: 1115  Group Topic: Psychoeducation    [facility] OP Lanette Schaefer MSW        Group Therapy Note  Clinician ran a double group with the patients due to escalation on the unit at the end of the 10:00 group. Clinician taught anxiety lives in the future thinking and depression lives in past thinking and the present they are able to make changes and regain their power. The second half of the group the clinician taught coping skills and grounding techniques.  Attendees: 9       Notes:  Patient was fully engaged in the discussion of the first group and participated fully in the coping skills and grounding techniques of the second half of the group.       Status After Intervention:  Improved    Participation Level: Active Listener and Interactive    Participation Quality: Appropriate, Attentive, and Sharing      Speech:  normal      Thought Process/Content: Logical      Affective Functioning: Congruent      Mood: anxious and euthymic      Level of consciousness:  Alert      Response to Learning: Able to verbalize current knowledge/experience      Endings: None Reported    Modes of Intervention: Education, Support, Exploration, and Activity      Discipline Responsible: /Counselor      Signature:  SHARON Cortez

## 2025-02-27 NOTE — BH NOTE
Patient requesting Percocet and Tramadol for right shoulder pain. Patient medicated with Percocet 5/325 mg po and Tramadol 10 mg po for right shoulder pain.

## 2025-02-28 VITALS
HEIGHT: 72 IN | WEIGHT: 180 LBS | DIASTOLIC BLOOD PRESSURE: 80 MMHG | SYSTOLIC BLOOD PRESSURE: 113 MMHG | TEMPERATURE: 97.5 F | OXYGEN SATURATION: 100 % | BODY MASS INDEX: 24.38 KG/M2 | HEART RATE: 82 BPM | RESPIRATION RATE: 16 BRPM

## 2025-02-28 PROBLEM — F39 MOOD DISORDER: Status: ACTIVE | Noted: 2025-02-25

## 2025-02-28 PROBLEM — F60.3 BORDERLINE PERSONALITY DISORDER (HCC): Status: ACTIVE | Noted: 2025-02-28

## 2025-02-28 LAB
ANION GAP SERPL CALCULATED.3IONS-SCNC: 10 MMOL/L (ref 3–16)
BUN SERPL-MCNC: 11 MG/DL (ref 7–20)
CALCIUM SERPL-MCNC: 8.3 MG/DL (ref 8.3–10.6)
CHLORIDE SERPL-SCNC: 107 MMOL/L (ref 99–110)
CO2 SERPL-SCNC: 27 MMOL/L (ref 21–32)
CREAT SERPL-MCNC: 1.3 MG/DL (ref 0.9–1.3)
GFR SERPLBLD CREATININE-BSD FMLA CKD-EPI: 70 ML/MIN/{1.73_M2}
GLUCOSE SERPL-MCNC: 86 MG/DL (ref 70–99)
POTASSIUM SERPL-SCNC: 4 MMOL/L (ref 3.5–5.1)
SODIUM SERPL-SCNC: 144 MMOL/L (ref 136–145)

## 2025-02-28 PROCEDURE — 6370000000 HC RX 637 (ALT 250 FOR IP)

## 2025-02-28 PROCEDURE — 36415 COLL VENOUS BLD VENIPUNCTURE: CPT

## 2025-02-28 PROCEDURE — 5130000000 HC BRIDGE APPOINTMENT

## 2025-02-28 PROCEDURE — G0378 HOSPITAL OBSERVATION PER HR: HCPCS

## 2025-02-28 PROCEDURE — 80048 BASIC METABOLIC PNL TOTAL CA: CPT

## 2025-02-28 RX ADMIN — GABAPENTIN 600 MG: 300 CAPSULE ORAL at 08:18

## 2025-02-28 RX ADMIN — FINASTERIDE 5 MG: 5 TABLET, FILM COATED ORAL at 08:18

## 2025-02-28 RX ADMIN — LEVETIRACETAM 500 MG: 500 TABLET, FILM COATED ORAL at 08:18

## 2025-02-28 RX ADMIN — OXYCODONE HYDROCHLORIDE AND ACETAMINOPHEN 1 TABLET: 5; 325 TABLET ORAL at 08:18

## 2025-02-28 RX ADMIN — TRAMADOL HYDROCHLORIDE 100 MG: 50 TABLET ORAL at 08:18

## 2025-02-28 RX ADMIN — NIFEDIPINE 30 MG: 30 TABLET, FILM COATED, EXTENDED RELEASE ORAL at 08:18

## 2025-02-28 RX ADMIN — METHOCARBAMOL TABLETS 500 MG: 500 TABLET, COATED ORAL at 08:18

## 2025-02-28 ASSESSMENT — PAIN SCALES - GENERAL
PAINLEVEL_OUTOF10: 2
PAINLEVEL_OUTOF10: 6

## 2025-02-28 ASSESSMENT — PAIN - FUNCTIONAL ASSESSMENT: PAIN_FUNCTIONAL_ASSESSMENT: ACTIVITIES ARE NOT PREVENTED

## 2025-02-28 ASSESSMENT — PAIN SCALES - WONG BAKER: WONGBAKER_NUMERICALRESPONSE: HURTS A LITTLE BIT

## 2025-02-28 ASSESSMENT — PAIN DESCRIPTION - LOCATION: LOCATION: SHOULDER

## 2025-02-28 ASSESSMENT — PAIN DESCRIPTION - DESCRIPTORS: DESCRIPTORS: ACHING;THROBBING

## 2025-02-28 ASSESSMENT — PAIN DESCRIPTION - ORIENTATION: ORIENTATION: RIGHT
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
## 2025-02-28 NOTE — PROGRESS NOTES
Behavioral Health [facility]  Discharge Note    Pt discharged with followings belongings:   Dental Appliances: None  Vision - Corrective Lenses: None  Hearing Aid: None  Jewelry: None  Body Piercings Removed: N/A  Clothing: Footwear, Pants, Shirt, Socks  Other Valuables: Other (Comment) (none)   Valuables sent home with patient. Patient educated on aftercare instructions: yes.  Patient verbalize understanding of AVS:  yes.    Status EXAM upon discharge:  Mental Status and Behavioral Exam  Normal: No  Level of Assistance: Independent/Self  Facial Expression: Flat  Affect: Blunt, Unstable  Level of Consciousness: Alert  Frequency of Checks: 4 times per hour, close  Mood:Normal: Yes  Mood: Labile  Motor Activity:Normal: Yes  Motor Activity: Decreased  Eye Contact: Good  Observed Behavior: Cooperative, Withdrawn  Sexual Misconduct History: Current - no  Involved In Any Sexual Misconduct With Others? : No  History of Sexually Inappropriate Behavior When Previously Hospitalized?: No  Uncontrollable/Compulsive Masturbation?: No  Difficulty Controlling Sexual Impulses?: No  Preception: [place] to person, [place] to time, [place] to place, [place] to situation  Attention:Normal: Yes  Attention: Distractible  Thought Processes: Unremarkable  Thought Content:Normal: Yes  Thought Content: Preoccupations, Paranoia  Depression Symptoms: Change in energy level, Isolative  Anxiety Symptoms: No problems reported or observed.  Kalie Symptoms: Poor judgment  Hallucinations: None  Delusions: No  Memory:Normal: Yes  Insight and Judgment: No  Insight and Judgment: Poor judgment, Poor insight    Tobacco Screening:  Practical Counseling, on admission, casey X, if applicable and completed (first 3 are required if patient doesn't refuse):            ( ) Recognizing danger situations (included triggers and roadblocks)                    ( ) Coping skills (new ways to manage stress,relaxation techniques, changing routine, distraction)                                                            ( ) Basic information about quitting (benefits of quitting, techniques in how to quit, available resources  ( ) [facility]                                                                                                                   ( ) Patient refused counseling  (X) Patient refused referral  ( ) Patient refused prescription upon discharge  ( ) Patient has not smoked in the last 30 days    Metabolic Screening:    Lab Results   Component Value Date    LABA1C 5.4 02/26/2025       Lab Results   Component Value Date    CHOL 155 02/26/2025     Lab Results   Component Value Date    TRIG 107 02/26/2025     Lab Results   Component Value Date    HDL 25 (L) 02/26/2025     No components found for: \"LDLCAL\"  No components found for: \"LABVLDL\"    Slim Menjivar (Peters), RN
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
## 2025-02-28 NOTE — PLAN OF CARE
Pt A/Ox4, has been isolative to room in bed. Pt has been flat and withdrawn but calm, cooperative, and friendly. Pt was compliant with scheduled medications and stated that he hopes to go home tomorrow. Pt denied SI/HI/AVH.     PRN tramadol and oxycodone given at 2150 with effectiveness.          Problem: Risk for Elopement  Goal: Patient will not exit the unit/facility without proper excort  Outcome: Progressing     Problem: Anxiety  Goal: Will report anxiety at manageable levels  Description: INTERVENTIONS:  1. Administer medication as ordered  2. Teach and rehearse alternative coping skills  3. Provide emotional support with 1:1 interaction with staff  Outcome: Progressing     Problem: Coping  Goal: Pt/Family able to verbalize concerns and demonstrate effective coping strategies  Description: INTERVENTIONS:  1. Assist patient/family to identify coping skills, available support systems and cultural and spiritual values  2. Provide emotional support, including active listening and acknowledgement of concerns of patient and caregivers  3. Reduce environmental stimuli, as able  4. Instruct patient/family in relaxation techniques, as appropriate  5. Assess for spiritual pain/suffering and initiate Spiritual Care, Psychosocial Clinical Specialist consults as needed  Outcome: Progressing     Problem: Depression/Self Harm  Goal: Effect of psychiatric condition will be minimized and patient will be protected from self harm  Description: INTERVENTIONS:  1. Assess impact of patient's symptoms on level of functioning, self care needs and offer support as indicated  2. Assess patient/family knowledge of depression, impact on illness and need for teaching  3. Provide emotional support, presence and reassurance  4. Assess for possible suicidal thoughts or ideation. If patient expresses suicidal thoughts or statements do not leave alone, initiate Suicide Precautions, move to a room close to the nursing station and obtain

## 2025-02-28 NOTE — PLAN OF CARE
Problem: Risk for Elopement  Goal: Patient will not exit the unit/facility without proper excort  2/28/2025 1046 by Nikki Stout LPN  Outcome: Progressing  2/28/2025 0044 by Jackie Morrell RN  Outcome: Progressing     Problem: Anxiety  Goal: Will report anxiety at manageable levels  Description: INTERVENTIONS:  1. Administer medication as ordered  2. Teach and rehearse alternative coping skills  3. Provide emotional support with 1:1 interaction with staff  2/28/2025 1046 by Nikki Stout LPN  Outcome: Progressing  2/28/2025 0044 by Jackie Morrell RN  Outcome: Progressing     Problem: Coping  Goal: Pt/Family able to verbalize concerns and demonstrate effective coping strategies  Description: INTERVENTIONS:  1. Assist patient/family to identify coping skills, available support systems and cultural and spiritual values  2. Provide emotional support, including active listening and acknowledgement of concerns of patient and caregivers  3. Reduce environmental stimuli, as able  4. Instruct patient/family in relaxation techniques, as appropriate  5. Assess for spiritual pain/suffering and initiate Spiritual Care, Psychosocial Clinical Specialist consults as needed  2/28/2025 1046 by Nikki Stout LPN  Outcome: Progressing  2/28/2025 0044 by Jackie Morrell RN  Outcome: Progressing     Problem: Depression/Self Harm  Goal: Effect of psychiatric condition will be minimized and patient will be protected from self harm  Description: INTERVENTIONS:  1. Assess impact of patient's symptoms on level of functioning, self care needs and offer support as indicated  2. Assess patient/family knowledge of depression, impact on illness and need for teaching  3. Provide emotional support, presence and reassurance  4. Assess for possible suicidal thoughts or ideation. If patient expresses suicidal thoughts or statements do not leave alone, initiate Suicide Precautions, move to a room close to the nursing station and  obtain sitter  5. Initiate consults as appropriate with Mental Health Professional, Spiritual Care, Psychosocial CNS, and consider a recommendation to the LIP for a Psychiatric Consultation  2/28/2025 1046 by Nikki Stout LPN  Outcome: Progressing  2/28/2025 0044 by Jackie Morrell, RN  Outcome: Progressing     Problem: Pain  Goal: Verbalizes/displays adequate comfort level or baseline comfort level  2/28/2025 1046 by Nikki Stout LPN  Outcome: Progressing  2/28/2025 1004 by Nikki Stout LPN  Outcome: Progressing  2/28/2025 0044 by Jackie Morrell RN  Outcome: Progressing  Flowsheets (Taken 2/27/2025 2150)  Verbalizes/displays adequate comfort level or baseline comfort level:   Encourage patient to monitor pain and request assistance   Administer analgesics based on type and severity of pain and evaluate response   Consider cultural and social influences on pain and pain management   Assess pain using appropriate pain scale   Implement non-pharmacological measures as appropriate and evaluate response   Notify Licensed Independent Practitioner if interventions unsuccessful or patient reports new pain   Pt visible on unit watching tv this am social with select peers, pt denies SI,HI,AVH, he continues to be flat and blunt.

## 2025-02-28 NOTE — GROUP NOTE
Group Therapy Note    Date: 2/28/2025    Group Start Time: 1000  Group End Time: 1045  Group Topic: Psychoeducation    [facility] [place] [facility]    Destiney Mosley LISW        Group Therapy Note    Attendees: 3       Patient's Goal:  to learn and discuss healthy vs unhealthy relationships. Pt's given handouts for discussion and asked to apply to their lives.     Notes:  pt attended group for the full duration. He did not actively participate in group discussion but appeared to be listening.     Status After Intervention:  Unchanged    Participation Level: Active Listener and Minimal    Participation Quality: Appropriate and Attentive      Speech:  mute      Thought Process/Content: Linear      Affective Functioning: Congruent      Mood: euthymic      Level of consciousness:  Alert      Response to Learning: Able to verbalize current knowledge/experience      Endings: None Reported    Modes of Intervention: Education, Support, Socialization, Exploration, and Clarifying      Discipline Responsible: /Counselor      Signature:  MASOOD Johnston

## 2025-02-28 NOTE — BH NOTE
Attempted to have an 11:00 am group. Patients were uninterested and/or declined to attend. LPC left self-directed worksheets regarding coping skills in the day space if patients changed their minds.

## 2025-02-28 NOTE — PLAN OF CARE
Problem: Risk for Elopement  Goal: Patient will not exit the unit/facility without proper excort  2/28/2025 1552 by Slim Menjivar RN (P[facility])  Outcome: Completed  2/28/2025 1046 by Nikki Stout LPN  Outcome: Progressing     Problem: Anxiety  Goal: Will report anxiety at manageable levels  Description: INTERVENTIONS:  1. Administer medication as ordered  2. Teach and rehearse alternative coping skills  3. Provide emotional support with 1:1 interaction with staff  2/28/2025 1552 by Slim Menjivar RN (Peters)  Outcome: Completed  2/28/2025 1046 by Nikki Stout LPN  Outcome: Progressing     Problem: Coping  Goal: Pt/Family able to verbalize concerns and demonstrate effective coping strategies  Description: INTERVENTIONS:  1. Assist patient/family to identify coping skills, available support systems and cultural and spiritual values  2. Provide emotional support, including active listening and acknowledgement of concerns of patient and caregivers  3. Reduce environmental stimuli, as able  4. Instruct patient/family in relaxation techniques, as appropriate  5. Assess for spiritual pain/suffering and initiate Spiritual Care, Psychosocial Clinical Specialist consults as needed  2/28/2025 1552 by Slim Menjivar RN (Peters)  Outcome: Completed  2/28/2025 1046 by Nikki Stout LPN  Outcome: Progressing     Problem: Depression/Self Harm  Goal: Effect of psychiatric condition will be minimized and patient will be protected from self harm  Description: INTERVENTIONS:  1. Assess impact of patient's symptoms on level of functioning, self care needs and offer support as indicated  2. Assess patient/family knowledge of depression, impact on illness and need for teaching  3. Provide emotional support, presence and reassurance  4. Assess for possible suicidal thoughts or ideation. If patient expresses suicidal thoughts or statements do not leave alone, initiate Suicide Precautions, move to a room close to the nursing

## 2025-02-28 NOTE — PLAN OF CARE
Problem: Pain  Goal: Verbalizes/displays adequate comfort level or baseline comfort level  2/28/2025 1004 by Nikki Stout LPN  Outcome: Progressing   Pt complains of chronic pain to his right shoulder, pt medicated for pain per Dr order. Pt states pain is at a scale #2 and that's about as good as it ever gets and is satisfied at this time.

## 2025-03-03 ENCOUNTER — FOLLOWUP TELEPHONE ENCOUNTER (OUTPATIENT)
Dept: PSYCHIATRY | Age: 40
End: 2025-03-03

## 2025-03-04 ENCOUNTER — FOLLOWUP TELEPHONE ENCOUNTER (OUTPATIENT)
Dept: PSYCHIATRY | Age: 40
End: 2025-03-04

## (undated) DEVICE — SURE SET-DOUBLE BASIN-LF: Brand: MEDLINE INDUSTRIES, INC.

## (undated) DEVICE — SHEET, T, LAPAROTOMY, STERILE: Brand: MEDLINE

## (undated) DEVICE — YANKAUER,OPEN TIP,W/O VENT,STERILE: Brand: MEDLINE INDUSTRIES, INC.

## (undated) DEVICE — COVER LT HNDL BLU PLAS

## (undated) DEVICE — SURGICAL SET UP - SURE SET: Brand: MEDLINE INDUSTRIES, INC.

## (undated) DEVICE — SUPPORT SCROT L AD L39-44IN SFT KNIT PCH SUSP WAISTBAND

## (undated) DEVICE — ELECTROSURGICAL PENCIL ROCKER SWITCH NON COATED BLADE ELECTRODE 10 FT (3 M) CORD HOLSTER: Brand: MEGADYNE

## (undated) DEVICE — PACK LAP IV REINF TBL CVR ADH UTIL UNDERBUTTOCK DRP W CUF

## (undated) DEVICE — SOLUTION IV 1000ML 0.9% SOD CHL

## (undated) DEVICE — UNDERPAD INCONT 2XL FOR 38-58IN WAIST MESH PROTCT + DISP

## (undated) DEVICE — GLOVE SURG SZ 65 THK91MIL LTX FREE SYN POLYISOPRENE

## (undated) DEVICE — TOWEL,STOP FLAG GOLD N-W: Brand: MEDLINE

## (undated) DEVICE — GARMENT,MEDLINE,DVT,INT,CALF,LG, GEN2: Brand: MEDLINE

## (undated) DEVICE — JEWISH HOSPITAL TURNOVER KIT: Brand: MEDLINE INDUSTRIES, INC.

## (undated) DEVICE — PLATE ES AD W 9FT CRD 2